# Patient Record
Sex: MALE | URBAN - METROPOLITAN AREA
[De-identification: names, ages, dates, MRNs, and addresses within clinical notes are randomized per-mention and may not be internally consistent; named-entity substitution may affect disease eponyms.]

---

## 2023-12-12 ENCOUNTER — TELEPHONE (OUTPATIENT)
Dept: GASTROENTEROLOGY | Facility: CLINIC | Age: 68
End: 2023-12-12

## 2023-12-12 NOTE — TELEPHONE ENCOUNTER
LMOM for patient to phone back and schedule a new patient appt with Dr. Greig Baumgarten for abnormal findings on imaging

## 2023-12-13 NOTE — TELEPHONE ENCOUNTER
Pt called to make appt.  With Dr. Wolfgang Marshall and I scheduled it for his next available 12/29/23

## 2023-12-28 RX ORDER — TRIAMCINOLONE ACETONIDE 5 MG/G
1 CREAM TOPICAL 3 TIMES DAILY
COMMUNITY
Start: 2023-09-01 | End: 2024-08-31

## 2023-12-28 RX ORDER — OXYCODONE HYDROCHLORIDE 30 MG/1
30 TABLET ORAL EVERY 4 HOURS PRN
COMMUNITY
Start: 2023-12-20

## 2023-12-28 RX ORDER — METOPROLOL SUCCINATE 50 MG/1
50 TABLET, EXTENDED RELEASE ORAL DAILY
COMMUNITY
Start: 2023-11-18

## 2023-12-28 RX ORDER — GABAPENTIN 300 MG/1
300 CAPSULE ORAL 3 TIMES DAILY
COMMUNITY
Start: 2023-12-20 | End: 2024-12-19

## 2023-12-28 RX ORDER — ATORVASTATIN CALCIUM 40 MG/1
40 TABLET, FILM COATED ORAL DAILY
COMMUNITY
Start: 2023-11-21

## 2023-12-28 RX ORDER — AMITRIPTYLINE HYDROCHLORIDE 50 MG/1
50 TABLET, FILM COATED ORAL
COMMUNITY
Start: 2023-12-20 | End: 2024-12-19

## 2023-12-28 RX ORDER — ASPIRIN 81 MG/1
81 TABLET ORAL DAILY
COMMUNITY

## 2023-12-28 RX ORDER — DULOXETIN HYDROCHLORIDE 60 MG/1
60 CAPSULE, DELAYED RELEASE ORAL 2 TIMES DAILY
COMMUNITY
Start: 2023-12-20 | End: 2024-12-19

## 2023-12-28 RX ORDER — ZOLPIDEM TARTRATE 10 MG/1
10 TABLET ORAL DAILY PRN
COMMUNITY
Start: 2023-12-20

## 2023-12-28 RX ORDER — LIDOCAINE 50 MG/G
PATCH TOPICAL
COMMUNITY
Start: 2023-09-25

## 2023-12-28 RX ORDER — METOPROLOL SUCCINATE 50 MG/1
50 TABLET, EXTENDED RELEASE ORAL
COMMUNITY
Start: 2023-08-06

## 2023-12-28 RX ORDER — BICTEGRAVIR SODIUM, EMTRICITABINE, AND TENOFOVIR ALAFENAMIDE FUMARATE 50; 200; 25 MG/1; MG/1; MG/1
1 TABLET ORAL DAILY
COMMUNITY
Start: 2023-08-08

## 2023-12-28 RX ORDER — FOLIC ACID 1 MG/1
1000 TABLET ORAL DAILY
COMMUNITY
Start: 2023-10-18

## 2023-12-28 RX ORDER — TRAZODONE HYDROCHLORIDE 150 MG/1
150 TABLET ORAL
COMMUNITY
Start: 2023-12-20 | End: 2024-01-19

## 2023-12-28 RX ORDER — ROPINIROLE 1 MG/1
1 TABLET, FILM COATED ORAL
COMMUNITY
Start: 2023-12-20 | End: 2024-12-19

## 2023-12-29 ENCOUNTER — CONSULT (OUTPATIENT)
Dept: GASTROENTEROLOGY | Facility: CLINIC | Age: 68
End: 2023-12-29
Payer: MEDICARE

## 2023-12-29 VITALS
BODY MASS INDEX: 26.07 KG/M2 | HEART RATE: 61 BPM | SYSTOLIC BLOOD PRESSURE: 176 MMHG | WEIGHT: 172 LBS | DIASTOLIC BLOOD PRESSURE: 77 MMHG | HEIGHT: 68 IN | OXYGEN SATURATION: 100 %

## 2023-12-29 DIAGNOSIS — R93.89 ABNORMAL ULTRASOUND: Primary | ICD-10-CM

## 2023-12-29 DIAGNOSIS — K76.0 FATTY LIVER: ICD-10-CM

## 2023-12-29 DIAGNOSIS — Z12.11 SCREENING FOR COLON CANCER: ICD-10-CM

## 2023-12-29 DIAGNOSIS — K59.01 SLOW TRANSIT CONSTIPATION: ICD-10-CM

## 2023-12-29 PROCEDURE — 99204 OFFICE O/P NEW MOD 45 MIN: CPT | Performed by: INTERNAL MEDICINE

## 2023-12-29 NOTE — PATIENT INSTRUCTIONS
Scheduled date of colonoscopy (as of today):1/31/24  Physician performing colonoscopy:Pyaam  Location of colonoscopy:[place]  Bowel prep reviewed with patient:Dulco/Miralax  Instructions reviewed with patient by: Heath marrero  Clearances:  none

## 2023-12-29 NOTE — PROGRESS NOTES
[facility] - Outpatient Consultation  Caesar Hurd 68 y.o. male MRN: [id number]  Encounter: [id number]          ASSESSMENT AND PLAN:      1. Abnormal ultrasound  -Fatty liver    2. Slow transit constipation  -High-fiber diet to include fruits, vegetables, whole-grain foods, daily  -Increase fiber supplement to twice daily  -Increase water intake to 6 to 8 glasses/day  - Colonoscopy; Future    3. Screening for colon cancer  - Colonoscopy; Future    4. Fatty liver  -Since the liver enzyme panel in April 2023 was essentially normal, I reassured the patient that it is unlikely that this fatty liver condition is aggressive.  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  I informed him that he is less likely to have the more aggressive form of fatty liver disease which we call metabolic dysfunction associated steatohepatitis (MASH)    Hepatic profile has been ordered    ______________________________________________________________________    HPI: Caesar comes to the office today for evaluation of an abnormal ultrasound.  The ultrasound which was performed on 12/1/2023 showed diffuse increase in hepatic echogenicity compatible with fatty infiltration.  There was no focal hepatic lesion.  This was compared to an ultrasound that was performed April 15, 2021 which showed a similar diffuse increase in hepatic echogenicity compatible with fatty infiltration.  His liver enzyme panel on August 16, 2021 showed an alkaline phosphatase of 128, total bili 0.5, AST 19, ALT 26.  He denies nausea vomiting but he does admit to right upper quadrant abdominal pain but this is provoked by exercise, not by eating.  He denies any heartburn, dysphagia, odynophagia.  He denies any rectal bleeding or melena.  He has a bowel movement every 4 days.  Sometimes she will have bowel movements more frequently.  Generally however has been suffering from constipation for  months to years.  He states that he does not drink a lot of water.  He also admits that he is not eating a lot of fiber.  He is taking Metamucil on a daily basis.  His BMI is elevated at 26.15 kg/m²      REVIEW OF SYSTEMS:    CONSTITUTIONAL: Denies any fever, chills, rigors, and weight loss.  HEENT: No earache or tinnitus. Denies hearing loss or visual disturbances.  CARDIOVASCULAR: No chest pain or palpitations.   RESPIRATORY: Denies any cough, hemoptysis, shortness of breath or dyspnea on exertion.  GASTROINTESTINAL: As noted in the History of Present Illness.   GENITOURINARY: No problems with urination. Denies any hematuria or dysuria.  NEUROLOGIC: No dizziness or vertigo, denies headaches.   MUSCULOSKELETAL: Denies any muscle or joint pain.   SKIN: Denies skin rashes or itching.   ENDOCRINE: Denies excessive thirst. Denies intolerance to heat or cold.  PSYCHOSOCIAL: Denies depression or anxiety. Denies any recent memory loss.       Historical Information   History reviewed. No pertinent past medical history.  History reviewed. No pertinent surgical history.  Social History   Social History     Substance and Sexual Activity   Alcohol Use Not Currently     Social History     Substance and Sexual Activity   Drug Use Never     Social History     Tobacco Use   Smoking Status Never   Smokeless Tobacco Never     Family History   Problem Relation Age of Onset    No Known Problems Mother     No Known Problems Father        Meds/Allergies       Current Outpatient Medications:     amitriptyline (ELAVIL) 50 mg tablet    aspirin (ECOTRIN LOW STRENGTH) 81 mg EC tablet    atorvastatin (LIPITOR) 40 mg tablet    bictegravir-emtricitab-tenofovir alafenamide (Biktarvy) -25 MG tablet    DARUNAVIR ETHANOLATE PO    DULoxetine (Cymbalta) 60 mg delayed release capsule    folic acid (FOLVITE) 1 mg tablet    gabapentin (NEURONTIN) 300 mg capsule    lidocaine (LIDODERM) 5 %    metoprolol succinate (TOPROL-XL) 50 mg 24 hr tablet     "metoprolol succinate (TOPROL-XL) 50 mg 24 hr tablet    oxyCODONE (ROXICODONE) 30 MG immediate release tablet    rOPINIRole (REQUIP) 1 mg tablet    traZODone (DESYREL) 150 mg tablet    triamcinolone (KENALOG) 0.5 % cream    zolpidem (AMBIEN) 10 mg tablet    No Known Allergies        Objective     Blood pressure (!) 176/77, pulse 61, height 5' 8\" (1.727 m), weight 78 kg (172 lb), SpO2 100%. Body mass index is 26.15 kg/m².        PHYSICAL EXAM:      General Appearance:   Alert, cooperative, no distress   HEENT:   Normocephalic, atraumatic, anicteric.     Neck:  Supple, symmetrical, trachea midline   Lungs:   Clear to auscultation bilaterally; no rales, rhonchi or wheezing; respirations unlabored    Heart::   Regular rate and rhythm; no murmur, rub, or gallop.   Abdomen:   Soft, non-tender, non-distended; normal bowel sounds; no masses, no organomegaly    Genitalia:   Deferred    Rectal:   Deferred    Extremities:  No cyanosis, clubbing or edema    Pulses:  2+ and symmetric    Skin:  No jaundice, rashes, or lesions    Lymph nodes:  No palpable cervical lymphadenopathy        Lab Results:   No visits with results within 1 Day(s) from this visit.   Latest known visit with results is:   No results found for any previous visit.         Radiology Results:   US LIVER    Result Date: 12/1/2023  Narrative: Clinical History: Liver pain. Comparison: Prior abdominal ultrasound, 4/15/2021. Comment: A right upper quadrant ultrasound was performed. The visualized portions of the pancreatic head and proximal body are unremarkable. The pancreatic tail is obscured by bowel gas. The liver is normal in size measuring 13.2 cm craniocaudally. Diffuse increase in hepatic echogenicity is compatible with fatty infiltration. There are no focal hepatic lesions appreciated given this limitation. Survey views of the right kidney show no hydronephrosis. The patient is status post cholecystectomy. There is no evidence of biliary ductal dilatation " with the extra hepatic bile duct measuring 5 mm in diameter.    Impression: IMPRESSION: Diffuse hepatic fatty infiltration. Poor visualization of portions of the pancreas due to bowel gas. Status post cholecystectomy. Other findings as above. Workstation:[id number]

## 2024-01-29 ENCOUNTER — TELEPHONE (OUTPATIENT)
Age: 69
End: 2024-01-29

## 2024-01-29 NOTE — TELEPHONE ENCOUNTER
Patient contacted office with procedure questions. Procedure instructions explained in detail. Patient expressed understanding and will contact our office with any further questions.

## 2024-01-31 ENCOUNTER — HOSPITAL ENCOUNTER (OUTPATIENT)
Dept: GASTROENTEROLOGY | Facility: HOSPITAL | Age: 69
Setting detail: OUTPATIENT SURGERY
Discharge: HOME | End: 2024-01-31
Attending: INTERNAL MEDICINE
Payer: MEDICARE

## 2024-01-31 ENCOUNTER — ANESTHESIA EVENT (OUTPATIENT)
Dept: GASTROENTEROLOGY | Facility: HOSPITAL | Age: 69
End: 2024-01-31

## 2024-01-31 ENCOUNTER — ANESTHESIA (OUTPATIENT)
Dept: GASTROENTEROLOGY | Facility: HOSPITAL | Age: 69
End: 2024-01-31

## 2024-01-31 VITALS
OXYGEN SATURATION: 99 % | HEIGHT: 68 IN | WEIGHT: 167.99 LBS | HEART RATE: 58 BPM | DIASTOLIC BLOOD PRESSURE: 77 MMHG | BODY MASS INDEX: 25.46 KG/M2 | RESPIRATION RATE: 17 BRPM | SYSTOLIC BLOOD PRESSURE: 162 MMHG | TEMPERATURE: 98 F

## 2024-01-31 DIAGNOSIS — Z12.11 SCREENING FOR COLON CANCER: ICD-10-CM

## 2024-01-31 DIAGNOSIS — K59.01 SLOW TRANSIT CONSTIPATION: ICD-10-CM

## 2024-01-31 PROBLEM — Z21 HIV INFECTION (HCC): Chronic | Status: ACTIVE | Noted: 2019-10-14

## 2024-01-31 PROBLEM — I10 ESSENTIAL HYPERTENSION: Status: ACTIVE | Noted: 2019-10-14

## 2024-01-31 PROBLEM — N18.31 STAGE 3A CHRONIC KIDNEY DISEASE (HCC): Status: ACTIVE | Noted: 2021-09-17

## 2024-01-31 PROBLEM — Z90.5 ACQUIRED ABSENCE OF KIDNEY: Status: ACTIVE | Noted: 2019-10-14

## 2024-01-31 PROBLEM — B20 HIV INFECTION (HCC): Chronic | Status: ACTIVE | Noted: 2019-10-14

## 2024-01-31 PROCEDURE — 88305 TISSUE EXAM BY PATHOLOGIST: CPT | Performed by: PATHOLOGY

## 2024-01-31 PROCEDURE — 45380 COLONOSCOPY AND BIOPSY: CPT | Performed by: INTERNAL MEDICINE

## 2024-01-31 RX ORDER — SODIUM CHLORIDE, SODIUM LACTATE, POTASSIUM CHLORIDE, CALCIUM CHLORIDE 600; 310; 30; 20 MG/100ML; MG/100ML; MG/100ML; MG/100ML
INJECTION, SOLUTION INTRAVENOUS CONTINUOUS PRN
Status: DISCONTINUED | OUTPATIENT
Start: 2024-01-31 | End: 2024-01-31

## 2024-01-31 RX ORDER — LIDOCAINE HYDROCHLORIDE 20 MG/ML
INJECTION, SOLUTION EPIDURAL; INFILTRATION; INTRACAUDAL; PERINEURAL AS NEEDED
Status: DISCONTINUED | OUTPATIENT
Start: 2024-01-31 | End: 2024-01-31

## 2024-01-31 RX ORDER — SODIUM CHLORIDE, SODIUM LACTATE, POTASSIUM CHLORIDE, CALCIUM CHLORIDE 600; 310; 30; 20 MG/100ML; MG/100ML; MG/100ML; MG/100ML
125 INJECTION, SOLUTION INTRAVENOUS CONTINUOUS
Status: CANCELLED | OUTPATIENT
Start: 2024-01-31

## 2024-01-31 RX ORDER — SODIUM CHLORIDE, SODIUM LACTATE, POTASSIUM CHLORIDE, CALCIUM CHLORIDE 600; 310; 30; 20 MG/100ML; MG/100ML; MG/100ML; MG/100ML
100 INJECTION, SOLUTION INTRAVENOUS CONTINUOUS
Status: CANCELLED | OUTPATIENT
Start: 2024-01-31

## 2024-01-31 RX ORDER — PROPOFOL 10 MG/ML
INJECTION, EMULSION INTRAVENOUS AS NEEDED
Status: DISCONTINUED | OUTPATIENT
Start: 2024-01-31 | End: 2024-01-31

## 2024-01-31 RX ADMIN — PROPOFOL 150 MG: 10 INJECTION, EMULSION INTRAVENOUS at 14:21

## 2024-01-31 RX ADMIN — PROPOFOL 50 MG: 10 INJECTION, EMULSION INTRAVENOUS at 14:34

## 2024-01-31 RX ADMIN — PROPOFOL 50 MG: 10 INJECTION, EMULSION INTRAVENOUS at 14:31

## 2024-01-31 RX ADMIN — LIDOCAINE HYDROCHLORIDE 80 MG: 20 INJECTION, SOLUTION EPIDURAL; INFILTRATION; INTRACAUDAL; PERINEURAL at 14:21

## 2024-01-31 RX ADMIN — PROPOFOL 50 MG: 10 INJECTION, EMULSION INTRAVENOUS at 14:25

## 2024-01-31 RX ADMIN — PROPOFOL 50 MG: 10 INJECTION, EMULSION INTRAVENOUS at 14:28

## 2024-01-31 RX ADMIN — SODIUM CHLORIDE, SODIUM LACTATE, POTASSIUM CHLORIDE, AND CALCIUM CHLORIDE: .6; .31; .03; .02 INJECTION, SOLUTION INTRAVENOUS at 13:01

## 2024-01-31 NOTE — ANESTHESIA PREPROCEDURE EVALUATION
Procedure:  COLONOSCOPY    Relevant Problems   CARDIO   (+) Essential hypertension      /RENAL   (+) Acquired absence of kidney   (+) Stage 3a chronic kidney disease (HCC)      Other   (+) HIV infection (HCC)        Physical Exam    Airway    Mallampati score: II  TM Distance: >3 FB  Neck ROM: full     Dental   Comment: Denies loose teeth     Cardiovascular  Cardiovascular exam normal    Pulmonary  Pulmonary exam normal     Other Findings  Portions of exam deferred due to low yield and/or unknown COVID status      Anesthesia Plan  ASA Score- 3     Anesthesia Type- IV sedation with anesthesia with ASA Monitors.         Additional Monitors:     Airway Plan:            Plan Factors-Exercise tolerance (METS): >4 METS.    Chart reviewed.   Existing labs reviewed. Patient summary reviewed.    Patient is not a current smoker.              Induction- intravenous.    Postoperative Plan-     Informed Consent- Anesthetic plan and risks discussed with patient.  I personally reviewed this patient with the CRNA. Discussed and agreed on the Anesthesia Plan with the CRNA..

## 2024-01-31 NOTE — H&P
"History and Physical -  [facility]  Caesar Hurd 68 y.o. male MRN: [id number]      HPI: Caesar Hurd is a 68 y.o. year old male who presents for evaluation of constipation and for screening colonoscopy      REVIEW OF SYSTEMS: Per the HPI, and otherwise unremarkable.    Historical Information   Past Medical History:   Diagnosis Date    Prostate cancer (HCC)      Past Surgical History:   Procedure Laterality Date    KIDNEY SURGERY Left     removal     Social History   Social History     Substance and Sexual Activity   Alcohol Use Not Currently     Social History     Substance and Sexual Activity   Drug Use Never     Social History     Tobacco Use   Smoking Status Never   Smokeless Tobacco Never     Family History   Problem Relation Age of Onset    No Known Problems Mother     No Known Problems Father        Meds/Allergies     (Not in a hospital admission)      No Known Allergies    Objective     Blood pressure 168/79, pulse 55, temperature 97.7 °F (36.5 °C), temperature source Temporal, resp. rate 18, height 5' 8\" (1.727 m), weight 76.2 kg (167 lb 15.9 oz), SpO2 99%.      PHYSICAL EXAM    Gen: NAD  CV: RRR  CHEST: Clear  ABD: soft, NT/ND  EXT: no edema      ASSESSMENT/PLAN:  This is a 68 y.o. year old male here for colonoscopy, and he is stable and optimized for his procedure.          "

## 2024-01-31 NOTE — ANESTHESIA POSTPROCEDURE EVALUATION
Post-Op Assessment Note    CV Status:  Stable  Pain Score: 0    Pain management: adequate       Mental Status:  Sleepy   Hydration Status:  Stable   PONV Controlled:  None   Airway Patency:  Patent     Post Op Vitals Reviewed: Yes    No anethesia notable event occurred.    Staff: Anesthesiologist, CRNA               /61 (01/31/24 1440)    Temp 98 °F (36.7 °C) (01/31/24 1440)    Pulse 55 (01/31/24 1440)   Resp 20 (01/31/24 1440)    SpO2 97 % (01/31/24 1440)

## 2024-02-05 PROCEDURE — 88305 TISSUE EXAM BY PATHOLOGIST: CPT | Performed by: PATHOLOGY

## 2024-02-07 ENCOUNTER — TELEPHONE (OUTPATIENT)
Dept: GASTROENTEROLOGY | Facility: CLINIC | Age: 69
End: 2024-02-07

## 2024-02-07 NOTE — TELEPHONE ENCOUNTER
----- Message from Tarun Hare DO sent at 2/7/2024  7:53 AM EST -----  Please call the patient with the polyp result.  The polyp of the rectum was a benign hyperplastic polyp.  The patient's next colonoscopy will be due in 10 years.   none

## 2024-02-07 NOTE — TELEPHONE ENCOUNTER
Attempted to call pt but no answer.  VM left with the polyp result, to do a colonoscopy in 10 years, and to call us back if any questions.  Office # provided.      Please call the patient with the polyp result.  The polyp of the rectum was a benign hyperplastic polyp.  The patient's next colonoscopy will be due in 10 years.

## 2025-04-26 ENCOUNTER — APPOINTMENT (EMERGENCY)
Dept: CT IMAGING | Facility: HOSPITAL | Age: 70
DRG: 392 | End: 2025-04-26
Payer: COMMERCIAL

## 2025-04-26 ENCOUNTER — HOSPITAL ENCOUNTER (INPATIENT)
Facility: HOSPITAL | Age: 70
LOS: 4 days | Discharge: HOME | DRG: 392 | End: 2025-04-30
Attending: EMERGENCY MEDICINE | Admitting: FAMILY MEDICINE
Payer: COMMERCIAL

## 2025-04-26 DIAGNOSIS — C80.0: ICD-10-CM

## 2025-04-26 DIAGNOSIS — K76.9 HEPATIC LESION: ICD-10-CM

## 2025-04-26 DIAGNOSIS — I10 ESSENTIAL HYPERTENSION: ICD-10-CM

## 2025-04-26 DIAGNOSIS — Z21 HIV INFECTION, UNSPECIFIED SYMPTOM STATUS (HCC): Chronic | ICD-10-CM

## 2025-04-26 DIAGNOSIS — M89.9 BONE LESION: ICD-10-CM

## 2025-04-26 DIAGNOSIS — K59.00 CONSTIPATION: Primary | ICD-10-CM

## 2025-04-26 DIAGNOSIS — R10.9 ABDOMINAL PAIN: ICD-10-CM

## 2025-04-26 DIAGNOSIS — K62.89 RECTAL MASS: ICD-10-CM

## 2025-04-26 DIAGNOSIS — K76.9 LESION OF LIVER: ICD-10-CM

## 2025-04-26 LAB
ALBUMIN SERPL BCG-MCNC: 4.2 G/DL (ref 3.5–5)
ALP SERPL-CCNC: 670 U/L (ref 34–104)
ALT SERPL W P-5'-P-CCNC: 17 U/L (ref 7–52)
ANION GAP SERPL CALCULATED.3IONS-SCNC: 4 MMOL/L (ref 4–13)
AST SERPL W P-5'-P-CCNC: 34 U/L (ref 13–39)
BASOPHILS # BLD AUTO: 0.03 THOUSANDS/ÂΜL (ref 0–0.1)
BASOPHILS NFR BLD AUTO: 0 % (ref 0–1)
BILIRUB SERPL-MCNC: 0.98 MG/DL (ref 0.2–1)
BILIRUB UR QL STRIP: NEGATIVE
BUN SERPL-MCNC: 17 MG/DL (ref 5–25)
CALCIUM SERPL-MCNC: 9.6 MG/DL (ref 8.4–10.2)
CHLORIDE SERPL-SCNC: 105 MMOL/L (ref 96–108)
CLARITY UR: CLEAR
CO2 SERPL-SCNC: 30 MMOL/L (ref 21–32)
COLOR UR: COLORLESS
CREAT SERPL-MCNC: 1 MG/DL (ref 0.6–1.3)
EOSINOPHIL # BLD AUTO: 0.06 THOUSAND/ÂΜL (ref 0–0.61)
EOSINOPHIL NFR BLD AUTO: 1 % (ref 0–6)
ERYTHROCYTE [DISTWIDTH] IN BLOOD BY AUTOMATED COUNT: 12.3 % (ref 11.6–15.1)
GFR SERPL CREATININE-BSD FRML MDRD: 76 ML/MIN/1.73SQ M
GLUCOSE SERPL-MCNC: 115 MG/DL (ref 65–140)
GLUCOSE UR STRIP-MCNC: NEGATIVE MG/DL
HCT VFR BLD AUTO: 34.8 % (ref 36.5–49.3)
HGB BLD-MCNC: 11.4 G/DL (ref 12–17)
HGB UR QL STRIP.AUTO: NEGATIVE
IMM GRANULOCYTES # BLD AUTO: 0.02 THOUSAND/UL (ref 0–0.2)
IMM GRANULOCYTES NFR BLD AUTO: 0 % (ref 0–2)
KETONES UR STRIP-MCNC: NEGATIVE MG/DL
LEUKOCYTE ESTERASE UR QL STRIP: NEGATIVE
LIPASE SERPL-CCNC: 16 U/L (ref 11–82)
LYMPHOCYTES # BLD AUTO: 2.41 THOUSANDS/ÂΜL (ref 0.6–4.47)
LYMPHOCYTES NFR BLD AUTO: 34 % (ref 14–44)
MCH RBC QN AUTO: 30.1 PG (ref 26.8–34.3)
MCHC RBC AUTO-ENTMCNC: 32.8 G/DL (ref 31.4–37.4)
MCV RBC AUTO: 92 FL (ref 82–98)
MONOCYTES # BLD AUTO: 0.8 THOUSAND/ÂΜL (ref 0.17–1.22)
MONOCYTES NFR BLD AUTO: 11 % (ref 4–12)
NEUTROPHILS # BLD AUTO: 3.68 THOUSANDS/ÂΜL (ref 1.85–7.62)
NEUTS SEG NFR BLD AUTO: 54 % (ref 43–75)
NITRITE UR QL STRIP: NEGATIVE
NRBC BLD AUTO-RTO: 0 /100 WBCS
PH UR STRIP.AUTO: 6 [PH]
PLATELET # BLD AUTO: 211 THOUSANDS/UL (ref 149–390)
PMV BLD AUTO: 8.4 FL (ref 8.9–12.7)
POTASSIUM SERPL-SCNC: 4.6 MMOL/L (ref 3.5–5.3)
PROT SERPL-MCNC: 7.1 G/DL (ref 6.4–8.4)
PROT UR STRIP-MCNC: NEGATIVE MG/DL
RBC # BLD AUTO: 3.79 MILLION/UL (ref 3.88–5.62)
SODIUM SERPL-SCNC: 139 MMOL/L (ref 135–147)
SP GR UR STRIP.AUTO: 1.02 (ref 1–1.03)
UROBILINOGEN UR STRIP-ACNC: <2 MG/DL
WBC # BLD AUTO: 7 THOUSAND/UL (ref 4.31–10.16)

## 2025-04-26 PROCEDURE — 99284 EMERGENCY DEPT VISIT MOD MDM: CPT

## 2025-04-26 PROCEDURE — 99223 1ST HOSP IP/OBS HIGH 75: CPT | Performed by: FAMILY MEDICINE

## 2025-04-26 PROCEDURE — 36415 COLL VENOUS BLD VENIPUNCTURE: CPT | Performed by: PHYSICIAN ASSISTANT

## 2025-04-26 PROCEDURE — 80053 COMPREHEN METABOLIC PANEL: CPT | Performed by: PHYSICIAN ASSISTANT

## 2025-04-26 PROCEDURE — 74177 CT ABD & PELVIS W/CONTRAST: CPT

## 2025-04-26 PROCEDURE — 99285 EMERGENCY DEPT VISIT HI MDM: CPT | Performed by: PHYSICIAN ASSISTANT

## 2025-04-26 PROCEDURE — 96360 HYDRATION IV INFUSION INIT: CPT

## 2025-04-26 PROCEDURE — 81003 URINALYSIS AUTO W/O SCOPE: CPT | Performed by: PHYSICIAN ASSISTANT

## 2025-04-26 PROCEDURE — 83690 ASSAY OF LIPASE: CPT | Performed by: PHYSICIAN ASSISTANT

## 2025-04-26 PROCEDURE — 85025 COMPLETE CBC W/AUTO DIFF WBC: CPT | Performed by: PHYSICIAN ASSISTANT

## 2025-04-26 RX ORDER — ZOLPIDEM TARTRATE 5 MG/1
10 TABLET ORAL DAILY PRN
Status: DISCONTINUED | OUTPATIENT
Start: 2025-04-26 | End: 2025-04-30 | Stop reason: HOSPADM

## 2025-04-26 RX ORDER — METOPROLOL SUCCINATE 50 MG/1
50 TABLET, EXTENDED RELEASE ORAL DAILY
Status: DISCONTINUED | OUTPATIENT
Start: 2025-04-27 | End: 2025-04-30 | Stop reason: HOSPADM

## 2025-04-26 RX ORDER — ACETAMINOPHEN 325 MG/1
650 TABLET ORAL EVERY 6 HOURS PRN
Status: DISCONTINUED | OUTPATIENT
Start: 2025-04-26 | End: 2025-04-30 | Stop reason: HOSPADM

## 2025-04-26 RX ORDER — OXYCODONE HYDROCHLORIDE 10 MG/1
20 TABLET ORAL EVERY 4 HOURS PRN
Refills: 0 | Status: DISCONTINUED | OUTPATIENT
Start: 2025-04-26 | End: 2025-04-30 | Stop reason: HOSPADM

## 2025-04-26 RX ORDER — HEPARIN SODIUM 5000 [USP'U]/ML
5000 INJECTION, SOLUTION INTRAVENOUS; SUBCUTANEOUS EVERY 8 HOURS SCHEDULED
Status: DISCONTINUED | OUTPATIENT
Start: 2025-04-26 | End: 2025-04-30 | Stop reason: HOSPADM

## 2025-04-26 RX ORDER — SODIUM CHLORIDE, SODIUM LACTATE, POTASSIUM CHLORIDE, CALCIUM CHLORIDE 600; 310; 30; 20 MG/100ML; MG/100ML; MG/100ML; MG/100ML
75 INJECTION, SOLUTION INTRAVENOUS CONTINUOUS
Status: DISCONTINUED | OUTPATIENT
Start: 2025-04-26 | End: 2025-04-29

## 2025-04-26 RX ORDER — ABIRATERONE 500 MG/1
1000 TABLET ORAL DAILY
COMMUNITY
Start: 2025-04-18

## 2025-04-26 RX ORDER — FOLIC ACID 1 MG/1
1000 TABLET ORAL DAILY
Status: DISCONTINUED | OUTPATIENT
Start: 2025-04-27 | End: 2025-04-30 | Stop reason: HOSPADM

## 2025-04-26 RX ORDER — HYDRALAZINE HYDROCHLORIDE 20 MG/ML
5 INJECTION INTRAMUSCULAR; INTRAVENOUS ONCE
Status: COMPLETED | OUTPATIENT
Start: 2025-04-26 | End: 2025-04-26

## 2025-04-26 RX ORDER — OXYCODONE HYDROCHLORIDE 10 MG/1
30 TABLET ORAL EVERY 4 HOURS PRN
Status: DISCONTINUED | OUTPATIENT
Start: 2025-04-26 | End: 2025-04-26

## 2025-04-26 RX ORDER — MAGNESIUM CARB/ALUMINUM HYDROX 105-160MG
296 TABLET,CHEWABLE ORAL ONCE
Status: DISCONTINUED | OUTPATIENT
Start: 2025-04-26 | End: 2025-04-30 | Stop reason: HOSPADM

## 2025-04-26 RX ORDER — POLYETHYLENE GLYCOL 3350 17 G/17G
17 POWDER, FOR SOLUTION ORAL 2 TIMES DAILY
Status: DISCONTINUED | OUTPATIENT
Start: 2025-04-27 | End: 2025-04-27

## 2025-04-26 RX ORDER — MAGNESIUM HYDROXIDE/ALUMINUM HYDROXICE/SIMETHICONE 120; 1200; 1200 MG/30ML; MG/30ML; MG/30ML
30 SUSPENSION ORAL EVERY 6 HOURS PRN
Status: DISCONTINUED | OUTPATIENT
Start: 2025-04-26 | End: 2025-04-30 | Stop reason: HOSPADM

## 2025-04-26 RX ORDER — AMOXICILLIN 250 MG
2 CAPSULE ORAL 2 TIMES DAILY
Status: DISCONTINUED | OUTPATIENT
Start: 2025-04-26 | End: 2025-04-30 | Stop reason: HOSPADM

## 2025-04-26 RX ORDER — ATORVASTATIN CALCIUM 40 MG/1
40 TABLET, FILM COATED ORAL
Status: DISCONTINUED | OUTPATIENT
Start: 2025-04-26 | End: 2025-04-30 | Stop reason: HOSPADM

## 2025-04-26 RX ORDER — HYDRALAZINE HYDROCHLORIDE 25 MG/1
25 TABLET, FILM COATED ORAL ONCE
Status: DISCONTINUED | OUTPATIENT
Start: 2025-04-26 | End: 2025-04-26

## 2025-04-26 RX ORDER — GABAPENTIN 300 MG/1
300 CAPSULE ORAL
Status: DISCONTINUED | OUTPATIENT
Start: 2025-04-26 | End: 2025-04-30 | Stop reason: HOSPADM

## 2025-04-26 RX ORDER — POLYETHYLENE GLYCOL 3350 17 G/17G
17 POWDER, FOR SOLUTION ORAL DAILY PRN
Status: DISCONTINUED | OUTPATIENT
Start: 2025-04-26 | End: 2025-04-26

## 2025-04-26 RX ORDER — ABIRATERONE 500 MG/1
1000 TABLET ORAL DAILY
Status: DISCONTINUED | OUTPATIENT
Start: 2025-04-27 | End: 2025-04-30 | Stop reason: HOSPADM

## 2025-04-26 RX ORDER — ONDANSETRON 2 MG/ML
4 INJECTION INTRAMUSCULAR; INTRAVENOUS EVERY 6 HOURS PRN
Status: DISCONTINUED | OUTPATIENT
Start: 2025-04-26 | End: 2025-04-30 | Stop reason: HOSPADM

## 2025-04-26 RX ADMIN — SODIUM CHLORIDE, SODIUM LACTATE, POTASSIUM CHLORIDE, AND CALCIUM CHLORIDE 75 ML/HR: .6; .31; .03; .02 INJECTION, SOLUTION INTRAVENOUS at 18:55

## 2025-04-26 RX ADMIN — HYDRALAZINE HYDROCHLORIDE 5 MG: 20 INJECTION INTRAMUSCULAR; INTRAVENOUS at 22:27

## 2025-04-26 RX ADMIN — OXYCODONE HYDROCHLORIDE 20 MG: 10 TABLET ORAL at 21:20

## 2025-04-26 RX ADMIN — SODIUM CHLORIDE 1000 ML: 0.9 INJECTION, SOLUTION INTRAVENOUS at 12:34

## 2025-04-26 RX ADMIN — GABAPENTIN 300 MG: 300 CAPSULE ORAL at 21:22

## 2025-04-26 RX ADMIN — HEPARIN SODIUM 5000 UNITS: 5000 INJECTION INTRAVENOUS; SUBCUTANEOUS at 21:22

## 2025-04-26 RX ADMIN — IOHEXOL 100 ML: 350 INJECTION, SOLUTION INTRAVENOUS at 13:05

## 2025-04-26 RX ADMIN — ATORVASTATIN CALCIUM 40 MG: 40 TABLET, FILM COATED ORAL at 18:55

## 2025-04-26 RX ADMIN — SENNOSIDES AND DOCUSATE SODIUM 2 TABLET: 50; 8.6 TABLET ORAL at 18:51

## 2025-04-26 RX ADMIN — HEPARIN SODIUM 5000 UNITS: 5000 INJECTION INTRAVENOUS; SUBCUTANEOUS at 18:55

## 2025-04-26 NOTE — PLAN OF CARE
Problem: PAIN - ADULT  Goal: Verbalizes/displays adequate comfort level or baseline comfort level  Description: Interventions:- Encourage patient to monitor pain and request assistance- Assess pain using appropriate pain scale- Administer analgesics based on type and severity of pain and evaluate response- Implement non-pharmacological measures as appropriate and evaluate response- Consider cultural and social influences on pain and pain management- Notify physician/advanced practitioner if interventions unsuccessful or patient reports new pain  Outcome: Progressing     Problem: INFECTION - ADULT  Goal: Absence or prevention of progression during hospitalization  Description: INTERVENTIONS:- Assess and monitor for signs and symptoms of infection- Monitor lab/diagnostic results- Monitor all insertion sites, i.e. indwelling lines, tubes, and drains- Monitor endotracheal if appropriate and nasal secretions for changes in amount and color- [place] appropriate cooling/warming therapies per order- Administer medications as ordered- Instruct and encourage patient and family to use good hand hygiene technique- Identify and instruct in appropriate isolation precautions for identified infection/condition  Outcome: Progressing

## 2025-04-26 NOTE — ASSESSMENT & PLAN NOTE
History of lymphoma, renal cell carcinoma, prostate adenocarcinoma; now with rectal mass concerning for cancer  Followed by [facility] oncology teams

## 2025-04-26 NOTE — ASSESSMENT & PLAN NOTE
Presents with 10 days of constipation, has been battling for months and was found to have a perirectal mass in January of this year.   Chronic constipation at this point, complicated by rectal mass  GI consulted  Aggressive bowel regimen  s/p soap suds enema, will give mineral oil enema and start oral regimen  Will monitor stool output

## 2025-04-26 NOTE — H&P
"H&P - Hospitalist   Name: Caesar Hurd 70 y.o. male I MRN: [id number]  Unit/Bed#: ED 24 I Date of Admission: 4/26/2025   Date of Service: 4/26/2025 I Hospital Day: 0     Assessment & Plan  Constipation  Presents with 10 days of constipation, has been battling for months and was found to have a perirectal mass in January of this year.   Chronic constipation at this point, complicated by rectal mass  GI consulted  Aggressive bowel regimen  s/p soap suds enema, will give mineral oil enema and start oral regimen  Will monitor stool output   Rectal mass  Identified earlier this year due to patient reporting sensation of something \"blocking him\" causing constipation. He underwent colonoscopy with biopsy 3/21/2025 through [facility] colorectal service. Biopsy was NOT consistent with colorectal cancer at that time.    CT abdomen/pelvis (4/26/25): There is a large rectal mass, with direct invasion of the mesorectal fascia, left seminal vesicle, left bladder base, left pelvic sidewall. There are multiple left iliac and multiple hepatic lesions, highly suspicious for metastatic disease. The prior outside comparison studies are not available for direct comparison at this time. There are also multiple sclerotic osseous lesions, consistent with metastatic disease. This may be separately related to the patient's prostate cancer.  GI consulted,  Plan for flex sig on Monday with additional biopsies  Additional recs pending workup  Essential hypertension  Chronic, on metoprolol which we will continue  Monitor VS per unit protocol   HIV infection (HCC)  History of, on Biktarvy and endorses compliance  Will continue HAART  Stage 3a chronic kidney disease (HCC)  Cr baseline 1.2-1.3, presenting Cr 1.00  Will monitor   Cancer of multiple primary sites (HCC)  History of lymphoma, renal cell carcinoma, prostate adenocarcinoma; now with rectal mass concerning for cancer  Followed by [facility] oncology teams      VTE Pharmacologic Prophylaxis: VTE " "Score: 5 High Risk (Score >/= 5) - Pharmacological DVT Prophylaxis Ordered: enoxaparin (Lovenox). Sequential Compression Devices Ordered.  Code Status: No Order FULL CODE   Discussion with family: Patient declined call to .     Anticipated Length of Stay: Patient will be admitted on an inpatient basis with an anticipated length of stay of greater than 2 midnights secondary to obstipation, rectal mass .    History of Present Illness   Chief Complaint: Constipation (Pt c/o constipation with last BM being 10 days ago. Pt reports \"trying everything\" with no relief. Hx prostate cancer. )     Caesar Hurd is a 70-year-old male with a PMHx of HIV on antiretroviral therapy, renal cell carcinoma (s/p left nephrectomy 2000), lymphoma (s/p chemotherapy 2008), prostate cancer IIIc (s/p adrogen deprivation therapy and radiation therapy 2019), HTN, and newly diagnosed rectal mass as per colonoscopy 3/21/2025 with [facility] colorectal surgery.  He is presenting to the ED for evaluation of constipation x10 days.  Workup in ER revealed large rectal mass with direct invasion into the mesorectal fascia, left seminal vesicle and bladder base as well as left pelvic sidewall with evidence of metastatic disease.  There is a large amount of stool but no evidence proximal dilatation to suggest complete obstruction.     The case was discussed with GI who recommend admission for management of constipation with plan for flex sig on Monday to reassess and obtain more biopsies, as patient is requesting second opinion for diagnosis/treatment.     Review of Systems   Constitutional:  Negative for activity change, appetite change, fever and unexpected weight change.   Respiratory:  Negative for shortness of breath.    Cardiovascular:  Negative for chest pain and palpitations.   Gastrointestinal:  Positive for abdominal distention, abdominal pain and constipation.   Genitourinary:  Negative for decreased urine volume and difficulty " urinating.   Allergic/Immunologic: Positive for immunocompromised state.   All other systems reviewed and are negative.      Historical Information   Past Medical History:   Diagnosis Date    Prostate cancer (HCC)      Past Surgical History:   Procedure Laterality Date    KIDNEY SURGERY Left     removal     Social History     Tobacco Use    Smoking status: Never    Smokeless tobacco: Never   Vaping Use    Vaping status: Never Used   Substance and Sexual Activity    Alcohol use: Not Currently    Drug use: Never    Sexual activity: Not on file     E-Cigarette/Vaping    E-Cigarette Use Never User      E-Cigarette/Vaping Substances    Nicotine No     THC No     CBD No     Flavoring No     Other No     Unknown No      Family History   Problem Relation Age of Onset    No Known Problems Mother     No Known Problems Father      Social History:  Marital Status: Single   Occupation:    Patient Pre-hospital Living Situation: Home  Patient Pre-hospital Level of Mobility: walks  Patient Pre-hospital Diet Restrictions:     Meds/Allergies   I have reviewed home medications with patient personally.  Prior to Admission medications    Medication Sig Start Date End Date Taking? Authorizing Provider   amitriptyline (ELAVIL) 50 mg tablet Take 50 mg by mouth 12/20/23 12/19/24  Historical Provider, MD   aspirin (ECOTRIN LOW STRENGTH) 81 mg EC tablet Take 81 mg by mouth daily    Historical Provider, MD   atorvastatin (LIPITOR) 40 mg tablet Take 40 mg by mouth daily 11/21/23   Historical Provider, MD   bictegravir-emtricitab-tenofovir alafenamide (Biktarvy) -25 MG tablet Take 1 tablet by mouth daily 8/8/23   Historical Provider, MD   DARUNAVIR ETHANOLATE  mg daily    Historical Provider, MD   DULoxetine (Cymbalta) 60 mg delayed release capsule Take 60 mg by mouth 2 (two) times a day 12/20/23 12/19/24  Historical Provider, MD   folic acid (FOLVITE) 1 mg tablet Take 1,000 mcg by mouth daily 10/18/23   Historical Provider, MD    gabapentin (NEURONTIN) 300 mg capsule Take 300 mg by mouth Three times a day 12/20/23 12/19/24  Historical Provider, MD   lidocaine (LIDODERM) 5 % PLACE 1 PATCH ON THE SKIN DAILY. REMOVE & DISCARD PATCH WITHIN 12 HOURS OR AS DIRECTED BY MD 9/25/23   Historical Provider, MD   metoprolol succinate (TOPROL-XL) 50 mg 24 hr tablet Take 50 mg by mouth daily 11/18/23   Historical Provider, MD   metoprolol succinate (TOPROL-XL) 50 mg 24 hr tablet Take 50 mg by mouth 8/6/23   Historical Provider, MD   oxyCODONE (ROXICODONE) 30 MG immediate release tablet Take 30 mg by mouth every 4 (four) hours as needed 12/20/23   Historical Provider, MD   rOPINIRole (REQUIP) 1 mg tablet Take 1 mg by mouth 12/20/23 12/19/24  Historical Provider, MD   traZODone (DESYREL) 150 mg tablet Take 150 mg by mouth 12/20/23 1/19/24  Historical Provider, MD   triamcinolone (KENALOG) 0.5 % cream Apply 1 application. topically 3 (three) times a day 9/1/23 8/31/24  Historical Provider, MD   zolpidem (AMBIEN) 10 mg tablet Take 10 mg by mouth daily as needed 12/20/23   Historical Provider, MD     No Known Allergies    Objective :  Temp:  [97.6 °F (36.4 °C)] 97.6 °F (36.4 °C)  HR:  [52-68] 52  BP: (180-203)/(73-88) 188/83  Resp:  [20] 20  SpO2:  [96 %-99 %] 98 %  O2 Device: None (Room air)    Physical Exam  Vitals and nursing note reviewed.   Constitutional:       General: He is awake. He is not in acute distress.     Appearance: Normal appearance. He is well-developed and well-groomed. He is not ill-appearing or toxic-appearing.   HENT:      Head: Normocephalic and atraumatic.      Mouth/Throat:      Mouth: Mucous membranes are moist.   Eyes:      General: No scleral icterus.  Cardiovascular:      Rate and Rhythm: Normal rate and regular rhythm.      Heart sounds: Murmur heard.   Pulmonary:      Effort: Pulmonary effort is normal. No respiratory distress.      Breath sounds: Normal breath sounds. No wheezing.   Abdominal:      General: Bowel sounds are  decreased. There is distension.      Palpations: Abdomen is soft.      Tenderness: There is no abdominal tenderness. There is no guarding.   Musculoskeletal:      Right lower leg: No edema.      Left lower leg: No edema.   Skin:     General: Skin is warm and dry.      Coloration: Skin is not jaundiced or pale.   Neurological:      General: No focal deficit present.      Mental Status: He is alert and oriented to person, place, and time. Mental status is at baseline.      Cranial Nerves: No cranial nerve deficit.   Psychiatric:         Attention and Perception: Attention and perception normal.         Mood and Affect: Mood and affect normal.         Speech: Speech normal.         Behavior: Behavior normal. Behavior is cooperative.         Thought Content: Thought content normal.         Cognition and Memory: Cognition normal.         Judgment: Judgment normal.           Lines/Drains:            Lab Results: I have reviewed the following results:  Results from last 7 days   Lab Units 04/26/25  1234   WBC Thousand/uL 7.00   HEMOGLOBIN g/dL 11.4*   HEMATOCRIT % 34.8*   PLATELETS Thousands/uL 211   SEGS PCT % 54   LYMPHO PCT % 34   MONO PCT % 11   EOS PCT % 1     Results from last 7 days   Lab Units 04/26/25  1234   SODIUM mmol/L 139   POTASSIUM mmol/L 4.6   CHLORIDE mmol/L 105   CO2 mmol/L 30   BUN mg/dL 17   CREATININE mg/dL 1.00   ANION GAP mmol/L 4   CALCIUM mg/dL 9.6   ALBUMIN g/dL 4.2   TOTAL BILIRUBIN mg/dL 0.98   ALK PHOS U/L 670*   ALT U/L 17   AST U/L 34   GLUCOSE RANDOM mg/dL 115             Lab Results   Component Value Date    HGBA1C 6.1 (H) 04/12/2021    HGBA1C 5.5 08/21/2018    HGBA1C 5.2 05/28/2018           Imaging Results Review: I reviewed radiology reports from this admission including: CT abdomen/pelvis.  Other Study Results Review: EKG was personally reviewed and my interpretation is: No EKG obtained..    Administrative Statements   I have spent a total time of 60 minutes in caring for this patient  on the day of the visit/encounter including Risks and benefits of tx options, Instructions for management, Patient and family education, Counseling / Coordination of care, Documenting in the medical record, Reviewing/placing orders in the medical record (including tests, medications, and/or procedures), Obtaining or reviewing history  , and Communicating with other healthcare professionals .    ** Please Note: This note has been constructed using a voice recognition system. **

## 2025-04-26 NOTE — PLAN OF CARE
Problem: PAIN - ADULT  Goal: Verbalizes/displays adequate comfort level or baseline comfort level  Description: Interventions:- Encourage patient to monitor pain and request assistance- Assess pain using appropriate pain scale- Administer analgesics based on type and severity of pain and evaluate response- Implement non-pharmacological measures as appropriate and evaluate response- Consider cultural and social influences on pain and pain management- Notify physician/advanced practitioner if interventions unsuccessful or patient reports new pain  Outcome: Progressing     Problem: INFECTION - ADULT  Goal: Absence or prevention of progression during hospitalization  Description: INTERVENTIONS:- Assess and monitor for signs and symptoms of infection- Monitor lab/diagnostic results- Monitor all insertion sites, i.e. indwelling lines, tubes, and drains- Monitor endotracheal if appropriate and nasal secretions for changes in amount and color- [place] appropriate cooling/warming therapies per order- Administer medications as ordered- Instruct and encourage patient and family to use good hand hygiene technique- Identify and instruct in appropriate isolation precautions for identified infection/condition  Outcome: Progressing  Goal: Absence of fever/infection during neutropenic period  Description: INTERVENTIONS:- Monitor WBC  Outcome: Progressing     Problem: SAFETY ADULT  Goal: Patient will remain free of falls  Description: INTERVENTIONS:- Educate patient/family on patient safety including physical limitations- Instruct patient to call for assistance with activity - Consult OT/PT to assist with strengthening/mobility - Keep Call bell within reach- Keep bed low and locked with side rails adjusted as appropriate- Keep care items and personal belongings within reach- Initiate and maintain comfort rounds- Make Fall Risk Sign visible to staff- Offer Toileting every  Hours, in advance of need- Initiate/Maintain alarm- Obtain  necessary fall risk management equipment: - Apply yellow socks and bracelet for high fall risk patients- Consider moving patient to room near nurses station  Outcome: Progressing  Goal: Maintain or return to baseline ADL function  Description: INTERVENTIONS:-  Assess patient's ability to carry out ADLs; assess patient's baseline for ADL function and identify physical deficits which impact ability to perform ADLs (bathing, care of mouth/teeth, toileting, grooming, dressing, etc.)- Assess/evaluate cause of self-care deficits - Assess range of motion- Assess patient's mobility; develop plan if impaired- Assess patient's need for assistive devices and provide as appropriate- Encourage maximum independence but intervene and supervise when necessary- Involve family in performance of ADLs- Assess for home care needs following discharge - Consider OT consult to assist with ADL evaluation and planning for discharge- Provide patient education as appropriate  Outcome: Progressing  Goal: Maintains/Returns to pre admission functional level  Description: INTERVENTIONS:- Perform AM-PAC 6 Click Basic Mobility/ Daily Activity assessment daily.- Set and communicate daily mobility goal to care team and patient/family/caregiver. - Collaborate with rehabilitation services on mobility goals if consulted- Perform Range of Motion  times a day.- Reposition patient every  hours.- Dangle patient  times a day- Stand patient  times a day- Ambulate patient  times a day- Out of bed to chair  times a day - Out of bed for meals  times a day- Out of bed for toileting- Record patient progress and toleration of activity level   Outcome: Progressing     Problem: DISCHARGE PLANNING  Goal: Discharge to home or other facility with appropriate resources  Description: INTERVENTIONS:- Identify barriers to discharge w/patient and caregiver- Arrange for needed discharge resources and transportation as appropriate- Identify discharge learning needs (meds, wound  care, etc.)- Arrange for interpretive services to assist at discharge as needed- Refer to Case Management Department for coordinating discharge planning if the patient needs post-hospital services based on physician/advanced practitioner order or complex needs related to functional status, cognitive ability, or social support system  Outcome: Progressing     Problem: Knowledge Deficit  Goal: Patient/family/caregiver demonstrates understanding of disease process, treatment plan, medications, and discharge instructions  Description: Complete learning assessment and assess knowledge base.Interventions:- Provide teaching at level of understanding- Provide teaching via preferred learning methods  Outcome: Progressing

## 2025-04-26 NOTE — ED PROVIDER NOTES
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 Yee.               Assessment & Plan       Medical Decision Making  Patient is a 70-year-old male with a PMHx of HIV, HTN, kidney cancer (s/p left nephrectomy), prostate cancer and newly diagnosed rectal mass, presenting to the ED for evaluation of constipation x10 days.     DDx including but not limited to: constipation, fecal impaction, bowel obstruction, ileus, volvulus, internal hernia, tumor.     Patient's labs are notable for an elevated alk phos (similar to prior) but are otherwise unremarkable.  CT abdomen/pelvis shows \"a large rectal mass, with direct invasion of the mesorectal fascia, left seminal vesicle, left bladder base, left pelvic sidewall; there are multiple left iliac and multiple hepatic lesions, highly suspicious for metastatic disease\".  The case was discussed with GI who recommend admission for management of constipation with plan for flex sig on Monday to reassess and obtain more biopsies.  Patient was admitted to internal medicine for continued workup and " "management.    Amount and/or Complexity of Data Reviewed  Labs: ordered. Decision-making details documented in ED Course.  Radiology: ordered.  Discussion of management or test interpretation with external provider(s): Senait Phillips PA-C (GI)    Risk  Prescription drug management.  Decision regarding hospitalization.        ED Course as of 04/26/25 1546   Sat Apr 26, 2025   1354 ALK PHOS(!): 670  Elevated but similar to prior (was 713 on 4/19/25)   1500 Per GI: \"I think we can admit here to address the constipation but then also could repeat a flex sig on Monday to reassess and do more biopsies. I don't think colorectal surg would necessarily want him transferred to [place] and do anything different in the short term without a more definitive tissue diagnosis especially with the concern for possible meta and the neg colonoscopy in March\"       Medications   sodium chloride 0.9 % bolus 1,000 mL (0 mL Intravenous Stopped 4/26/25 1334)   iohexol (OMNIPAQUE) 350 MG/ML injection (MULTI-DOSE) 100 mL (100 mL Intravenous Given 4/26/25 1305)       ED Risk Strat Scores                    No data recorded        SBIRT 20yo+      Flowsheet Row Most Recent Value   Initial Alcohol Screen: US AUDIT-C     1. How often do you have a drink containing alcohol? 0 Filed at: 04/26/2025 1105   2. How many drinks containing alcohol do you have on a typical day you are drinking?  0 Filed at: 04/26/2025 1105   3b. FEMALE Any Age, or MALE 65+: How often do you have 4 or more drinks on one occassion? 0 Filed at: 04/26/2025 1105   Audit-C Score 0 Filed at: 04/26/2025 1105   GARY: How many times in the past year have you...    Used an illegal drug or used a prescription medication for non-medical reasons? Never Filed at: 04/26/2025 1105                            History of Present Illness       Chief Complaint   Patient presents with    Constipation     Pt c/o constipation with last BM being 10 days ago. Pt reports \"trying everything\" with no " "relief. Hx prostate cancer.        Past Medical History:   Diagnosis Date    Prostate cancer (HCC)       Past Surgical History:   Procedure Laterality Date    KIDNEY SURGERY Left     removal      Family History   Problem Relation Age of Onset    No Known Problems Mother     No Known Problems Father       Social History     Tobacco Use    Smoking status: Never    Smokeless tobacco: Never   Vaping Use    Vaping status: Never Used   Substance Use Topics    Alcohol use: Not Currently    Drug use: Never      E-Cigarette/Vaping    E-Cigarette Use Never User       E-Cigarette/Vaping Substances    Nicotine No     THC No     CBD No     Flavoring No     Other No     Unknown No       I have reviewed and agree with the history as documented.     Patient is a 70-year-old male with a PMHx of HIV, HTN, kidney cancer (s/p left nephrectomy), prostate cancer (on Leupron q4mo) and newly diagnosed rectal mass, presenting to the ED for evaluation of constipation x10 days.  Patient states that he has had persistent constipation for the past 10 days.  He reports associated abdominal pain and distention.  He denies any fevers, chills, nausea or vomiting.  He has tried stool softeners, laxatives and a suppository without any relief.  Of note, patient has a newly diagnosed rectal mass that was seen on CT on 3/4/2025.  [facility] shows a \"locally advanced rectal carcinoma with regional and distant metastatic disease\".  Patient had a colonoscopy at [facility] that showed a polyp and irregularity in the rectum with no evidence of rectal mass.  The biopsy of the polyp and irregularity in the rectum were negative for malignancy; however, patient's colorectal surgeon recommended further workup of the rectal mass as they are still concerned for malignancy.          Review of Systems   Constitutional:  Negative for chills and fever.   HENT:  Negative for congestion, ear pain and sore throat.    Respiratory:  Negative for cough and shortness " of breath.    Cardiovascular:  Negative for chest pain, palpitations and leg swelling.   Gastrointestinal:  Positive for abdominal distention, abdominal pain and constipation. Negative for blood in stool, diarrhea, nausea and vomiting.   Genitourinary:  Negative for dysuria, flank pain and hematuria.   Musculoskeletal:  Negative for back pain and neck pain.   Skin:  Negative for rash.   Neurological:  Negative for dizziness, seizures, syncope and headaches.   All other systems reviewed and are negative.          Objective       ED Triage Vitals   Temperature Pulse Blood Pressure Respirations SpO2 Patient Position - Orthostatic VS   04/26/25 1102 04/26/25 1102 04/26/25 1102 04/26/25 1102 04/26/25 1102 04/26/25 1102   97.6 °F (36.4 °C) 68 (!) 180/73 20 96 % Sitting      Temp Source Heart Rate Source BP Location FiO2 (%) Pain Score    04/26/25 1102 04/26/25 1315 04/26/25 1102 -- --    Oral Monitor Left arm        Vitals      Date and Time Temp Pulse SpO2 Resp BP Pain Score FACES Pain Rating User   04/26/25 1400 -- 52 98 % 20 188/83 -- -- [facility]   04/26/25 1315 -- 57 99 % 20 203/88 -- -- [facility]   04/26/25 1102 97.6 °F (36.4 °C) 68 96 % 20 180/73 -- -- [place]            Physical Exam  Vitals and nursing note reviewed.   Constitutional:       General: He is awake. He is not in acute distress.     Appearance: Normal appearance. He is well-developed. He is not ill-appearing or diaphoretic.   HENT:      Head: Normocephalic and atraumatic.      Right Ear: External ear normal.      Left Ear: External ear normal.      Nose: Nose normal.      Mouth/Throat:      Lips: Pink.      Mouth: Mucous membranes are moist.   Eyes:      General: Lids are normal. No scleral icterus.     Conjunctiva/sclera: Conjunctivae normal.      Pupils: Pupils are equal, round, and reactive to light.   Cardiovascular:      Rate and Rhythm: Normal rate and regular rhythm.      Pulses: Normal pulses.           Radial pulses are 2+ on the right side and 2+ on the left  side.      Heart sounds: Normal heart sounds, S1 normal and S2 normal.   Pulmonary:      Effort: Pulmonary effort is normal. No accessory muscle usage.      Breath sounds: Normal breath sounds. No stridor. No decreased breath sounds, wheezing, rhonchi or rales.   Abdominal:      General: Abdomen is flat. Bowel sounds are normal. There is distension (mild).      Palpations: Abdomen is soft.      Tenderness: There is generalized abdominal tenderness. There is no right CVA tenderness, left CVA tenderness, guarding or rebound.   Musculoskeletal:      Cervical back: Full passive range of motion without pain, normal range of motion and neck supple. No signs of trauma. No pain with movement. Normal range of motion.      Right lower leg: No edema.      Left lower leg: No edema.   Lymphadenopathy:      Cervical: No cervical adenopathy.   Skin:     General: Skin is warm and dry.      Capillary Refill: Capillary refill takes less than 2 seconds.      Coloration: Skin is not cyanotic, jaundiced or pale.   Neurological:      Mental Status: He is alert and oriented to person, place, and time.      GCS: GCS eye subscore is 4. GCS verbal subscore is 5. GCS motor subscore is 6.      Cranial Nerves: No dysarthria or facial asymmetry.      Gait: Gait normal.   Psychiatric:         Attention and Perception: Attention normal.         Mood and Affect: Mood normal.         Speech: Speech normal.         Behavior: Behavior normal. Behavior is cooperative.         Results Reviewed       Procedure Component Value Units Date/Time    UA w Reflex to Microscopic w Reflex to Culture [[id number]] Collected: 04/26/25 1336    Lab Status: Final result Specimen: Urine, Clean Catch Updated: 04/26/25 1343     Color, UA Colorless     Clarity, UA Clear     Specific Gravity, UA 1.024     pH, UA 6.0     Leukocytes, UA Negative     Nitrite, UA Negative     Protein, UA Negative mg/dl      Glucose, UA Negative mg/dl      Ketones, UA Negative mg/dl       Urobilinogen, UA <2.0 mg/dl      Bilirubin, UA Negative     Occult Blood, UA Negative    Comprehensive metabolic panel [[id number]]  (Abnormal) Collected: 04/26/25 1234    Lab Status: Final result Specimen: Blood from Arm, Right Updated: 04/26/25 1254     Sodium 139 mmol/L      Potassium 4.6 mmol/L      Chloride 105 mmol/L      CO2 30 mmol/L      ANION GAP 4 mmol/L      BUN 17 mg/dL      Creatinine 1.00 mg/dL      Glucose 115 mg/dL      Calcium 9.6 mg/dL      AST 34 U/L      ALT 17 U/L      Alkaline Phosphatase 670 U/L      Total Protein 7.1 g/dL      Albumin 4.2 g/dL      Total Bilirubin 0.98 mg/dL      eGFR 76 ml/min/1.73sq m     Narrative:      National Kid[employer] Disease Foundation guidelines for Chronic Kidney Disease (CKD):     Stage 1 with normal or high GFR (GFR > 90 mL/min/1.73 square meters)    Stage 2 Mild CKD (GFR = 60-89 mL/min/1.73 square meters)    Stage 3A Moderate CKD (GFR = 45-59 mL/min/1.73 square meters)    Stage 3B Moderate CKD (GFR = 30-44 mL/min/1.73 square meters)    Stage 4 Severe CKD (GFR = 15-29 mL/min/1.73 square meters)    Stage 5 End Stage CKD (GFR <15 mL/min/1.73 square meters)  Note: GFR calculation is accurate only with a steady state creatinine    Lipase [[id number]]  (Normal) Collected: 04/26/25 1234    Lab Status: Final result Specimen: Blood from Arm, Right Updated: 04/26/25 1254     Lipase 16 u/L     CBC and differential [[id number]]  (Abnormal) Collected: 04/26/25 1234    Lab Status: Final result Specimen: Blood from Arm, Right Updated: 04/26/25 1241     WBC 7.00 Thousand/uL      RBC 3.79 Million/uL      Hemoglobin 11.4 g/dL      Hematocrit 34.8 %      MCV 92 fL      MCH 30.1 pg      MCHC 32.8 g/dL      RDW 12.3 %      MPV 8.4 fL      Platelets 211 Thousands/uL      nRBC 0 /100 WBCs      Segmented % 54 %      Immature Grans % 0 %      Lymphocytes % 34 %      Monocytes % 11 %      Eosinophils Relative 1 %      Basophils Relative 0 %      Absolute Neutrophils 3.68 Thousands/µL       Absolute Immature Grans 0.02 Thousand/uL      Absolute Lymphocytes 2.41 Thousands/µL      Absolute Monocytes 0.80 Thousand/µL      Eosinophils Absolute 0.06 Thousand/µL      Basophils Absolute 0.03 Thousands/µL             CT abdomen pelvis with contrast   Final Interpretation by Kyle Dash MD ( [id number])      1.  There is a large rectal mass, with direct invasion of the mesorectal fascia, left seminal vesicle, left bladder base, left pelvic sidewall. There are multiple left iliac and multiple hepatic lesions, highly suspicious for metastatic disease. The    prior outside comparison studies are not available for direct comparison at this time.   2.  There are also multiple sclerotic osseous lesions, consistent with metastatic disease. This may be separately related to the patient's prostate cancer.      Please refer to the report body for description of other incidental, chronic and/or benign findings.         Workstation performed: [id number]             Procedures    ED Medication and Procedure Management   Prior to Admission Medications   Prescriptions Last Dose Informant Patient Reported? Taking?   DARUNAVIR ETHANOLATE PO  Self Yes No   Si mg daily   DULoxetine (Cymbalta) 60 mg delayed release capsule  Self Yes No   Sig: Take 60 mg by mouth 2 (two) times a day   amitriptyline (ELAVIL) 50 mg tablet  Self Yes No   Sig: Take 50 mg by mouth   aspirin (ECOTRIN LOW STRENGTH) 81 mg EC tablet  Self Yes No   Sig: Take 81 mg by mouth daily   atorvastatin (LIPITOR) 40 mg tablet  Self Yes No   Sig: Take 40 mg by mouth daily   bictegravir-emtricitab-tenofovir alafenamide (Biktarvy) -25 MG tablet  Self Yes No   Sig: Take 1 tablet by mouth daily   folic acid (FOLVITE) 1 mg tablet  Self Yes No   Sig: Take 1,000 mcg by mouth daily   gabapentin (NEURONTIN) 300 mg capsule  Self Yes No   Sig: Take 300 mg by mouth Three times a day   lidocaine (LIDODERM) 5 %  Self Yes No   Sig: PLACE 1 PATCH ON THE SKIN DAILY.  REMOVE & DISCARD PATCH WITHIN 12 HOURS OR AS DIRECTED BY MD   metoprolol succinate (TOPROL-XL) 50 mg 24 hr tablet  Self Yes No   Sig: Take 50 mg by mouth daily   metoprolol succinate (TOPROL-XL) 50 mg 24 hr tablet  Self Yes No   Sig: Take 50 mg by mouth   oxyCODONE (ROXICODONE) 30 MG immediate release tablet  Self Yes No   Sig: Take 30 mg by mouth every 4 (four) hours as needed   rOPINIRole (REQUIP) 1 mg tablet  Self Yes No   Sig: Take 1 mg by mouth   traZODone (DESYREL) 150 mg tablet  Self Yes No   Sig: Take 150 mg by mouth   triamcinolone (KENALOG) 0.5 % cream  Self Yes No   Sig: Apply 1 application. topically 3 (three) times a day   zolpidem (AMBIEN) 10 mg tablet  Self Yes No   Sig: Take 10 mg by mouth daily as needed      Facility-Administered Medications: None     Patient's Medications   Discharge Prescriptions    No medications on file     No discharge procedures on file.  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

## 2025-04-26 NOTE — HOSPITAL COURSE
Caesar Hurd is a 70-year-old male with a PMHx of HIV on antiretroviral therapy, renal cell carcinoma (s/p left nephrectomy 2000), lymphoma (s/p chemotherapy 2008), prostate cancer IIIc (s/p adrogen deprivation therapy and radiation therapy 2019), HTN, and newly diagnosed rectal mass as per colonoscopy 3/21/2025 with [facility] colorectal surgery.  He is presenting to the ED for evaluation of constipation x10 days.  Workup in ER revealed large rectal mass with direct invasion into the mesorectal fascia, left seminal vesicle and bladder base as well as left pelvic sidewall with evidence of metastatic disease.  There is a large amount of stool but no evidence proximal dilatation to suggest complete obstruction.     The case was discussed with GI who recommend admission for management of constipation with plan for flex sig on Monday to reassess and obtain more biopsies, as patient is requesting second opinion for diagnosis/treatment.

## 2025-04-26 NOTE — ASSESSMENT & PLAN NOTE
"Identified earlier this year due to patient reporting sensation of something \"blocking him\" causing constipation. He underwent colonoscopy with biopsy 3/21/2025 through [facility] colorectal service. Biopsy was NOT consistent with colorectal cancer at that time.    CT abdomen/pelvis (4/26/25): There is a large rectal mass, with direct invasion of the mesorectal fascia, left seminal vesicle, left bladder base, left pelvic sidewall. There are multiple left iliac and multiple hepatic lesions, highly suspicious for metastatic disease. The prior outside comparison studies are not available for direct comparison at this time. There are also multiple sclerotic osseous lesions, consistent with metastatic disease. This may be separately related to the patient's prostate cancer.  GI consulted,  Plan for flex sig on Monday with additional biopsies  Additional recs pending workup  "

## 2025-04-27 PROBLEM — K76.9 LESION OF LIVER: Status: ACTIVE | Noted: 2025-04-27

## 2025-04-27 LAB
ANION GAP SERPL CALCULATED.3IONS-SCNC: 6 MMOL/L (ref 4–13)
BUN SERPL-MCNC: 13 MG/DL (ref 5–25)
CALCIUM SERPL-MCNC: 9.3 MG/DL (ref 8.4–10.2)
CHLORIDE SERPL-SCNC: 103 MMOL/L (ref 96–108)
CO2 SERPL-SCNC: 30 MMOL/L (ref 21–32)
CREAT SERPL-MCNC: 0.91 MG/DL (ref 0.6–1.3)
ERYTHROCYTE [DISTWIDTH] IN BLOOD BY AUTOMATED COUNT: 12.4 % (ref 11.6–15.1)
GFR SERPL CREATININE-BSD FRML MDRD: 86 ML/MIN/1.73SQ M
GLUCOSE SERPL-MCNC: 149 MG/DL (ref 65–140)
HCT VFR BLD AUTO: 32.1 % (ref 36.5–49.3)
HGB BLD-MCNC: 10.5 G/DL (ref 12–17)
MCH RBC QN AUTO: 29.9 PG (ref 26.8–34.3)
MCHC RBC AUTO-ENTMCNC: 32.7 G/DL (ref 31.4–37.4)
MCV RBC AUTO: 92 FL (ref 82–98)
PLATELET # BLD AUTO: 228 THOUSANDS/UL (ref 149–390)
PMV BLD AUTO: 9.4 FL (ref 8.9–12.7)
POTASSIUM SERPL-SCNC: 3.9 MMOL/L (ref 3.5–5.3)
RBC # BLD AUTO: 3.51 MILLION/UL (ref 3.88–5.62)
SODIUM SERPL-SCNC: 139 MMOL/L (ref 135–147)
WBC # BLD AUTO: 6.46 THOUSAND/UL (ref 4.31–10.16)

## 2025-04-27 PROCEDURE — 99232 SBSQ HOSP IP/OBS MODERATE 35: CPT | Performed by: PHYSICIAN ASSISTANT

## 2025-04-27 PROCEDURE — 99223 1ST HOSP IP/OBS HIGH 75: CPT | Performed by: INTERNAL MEDICINE

## 2025-04-27 PROCEDURE — 85027 COMPLETE CBC AUTOMATED: CPT | Performed by: PHYSICIAN ASSISTANT

## 2025-04-27 PROCEDURE — 80048 BASIC METABOLIC PNL TOTAL CA: CPT | Performed by: PHYSICIAN ASSISTANT

## 2025-04-27 RX ORDER — POLYETHYLENE GLYCOL 3350 17 G/17G
238 POWDER, FOR SOLUTION ORAL ONCE
Status: COMPLETED | OUTPATIENT
Start: 2025-04-27 | End: 2025-04-27

## 2025-04-27 RX ORDER — LABETALOL HYDROCHLORIDE 5 MG/ML
10 INJECTION, SOLUTION INTRAVENOUS ONCE
Status: COMPLETED | OUTPATIENT
Start: 2025-04-27 | End: 2025-04-27

## 2025-04-27 RX ORDER — HYDRALAZINE HYDROCHLORIDE 20 MG/ML
5 INJECTION INTRAMUSCULAR; INTRAVENOUS EVERY 4 HOURS PRN
Status: DISCONTINUED | OUTPATIENT
Start: 2025-04-27 | End: 2025-04-28

## 2025-04-27 RX ADMIN — METOPROLOL SUCCINATE 50 MG: 50 TABLET, EXTENDED RELEASE ORAL at 09:21

## 2025-04-27 RX ADMIN — BICTEGRAVIR SODIUM, EMTRICITABINE, AND TENOFOVIR ALAFENAMIDE FUMARATE 1 TABLET: 50; 200; 25 TABLET ORAL at 09:21

## 2025-04-27 RX ADMIN — POLYETHYLENE GLYCOL 3350 238 G: 17 POWDER, FOR SOLUTION ORAL at 13:07

## 2025-04-27 RX ADMIN — Medication 10 MG: at 00:37

## 2025-04-27 RX ADMIN — GABAPENTIN 300 MG: 300 CAPSULE ORAL at 21:09

## 2025-04-27 RX ADMIN — ZOLPIDEM TARTRATE 10 MG: 5 TABLET, FILM COATED ORAL at 02:29

## 2025-04-27 RX ADMIN — HEPARIN SODIUM 5000 UNITS: 5000 INJECTION INTRAVENOUS; SUBCUTANEOUS at 14:25

## 2025-04-27 RX ADMIN — OXYCODONE HYDROCHLORIDE 20 MG: 10 TABLET ORAL at 11:30

## 2025-04-27 RX ADMIN — SENNOSIDES AND DOCUSATE SODIUM 2 TABLET: 50; 8.6 TABLET ORAL at 17:03

## 2025-04-27 RX ADMIN — ATORVASTATIN CALCIUM 40 MG: 40 TABLET, FILM COATED ORAL at 16:00

## 2025-04-27 RX ADMIN — SODIUM CHLORIDE, SODIUM LACTATE, POTASSIUM CHLORIDE, AND CALCIUM CHLORIDE 75 ML/HR: .6; .31; .03; .02 INJECTION, SOLUTION INTRAVENOUS at 04:53

## 2025-04-27 RX ADMIN — FOLIC ACID 1000 MCG: 1 TABLET ORAL at 09:21

## 2025-04-27 RX ADMIN — OXYCODONE HYDROCHLORIDE 20 MG: 10 TABLET ORAL at 16:00

## 2025-04-27 RX ADMIN — HYDRALAZINE HYDROCHLORIDE 5 MG: 20 INJECTION INTRAMUSCULAR; INTRAVENOUS at 19:31

## 2025-04-27 RX ADMIN — OXYCODONE HYDROCHLORIDE 20 MG: 10 TABLET ORAL at 20:38

## 2025-04-27 RX ADMIN — HEPARIN SODIUM 5000 UNITS: 5000 INJECTION INTRAVENOUS; SUBCUTANEOUS at 05:02

## 2025-04-27 RX ADMIN — SODIUM CHLORIDE, SODIUM LACTATE, POTASSIUM CHLORIDE, AND CALCIUM CHLORIDE 75 ML/HR: .6; .31; .03; .02 INJECTION, SOLUTION INTRAVENOUS at 19:31

## 2025-04-27 RX ADMIN — OXYCODONE HYDROCHLORIDE 20 MG: 10 TABLET ORAL at 04:31

## 2025-04-27 RX ADMIN — HEPARIN SODIUM 5000 UNITS: 5000 INJECTION INTRAVENOUS; SUBCUTANEOUS at 21:09

## 2025-04-27 NOTE — PROGRESS NOTES
"Progress Note - Hospitalist   Name: Caesar Hurd 70 y.o. male I MRN: [id number]  Unit/Bed#: -[facility] I Date of Admission: 4/26/2025   Date of Service: 4/27/2025 I Hospital Day: 1    Assessment & Plan  Constipation  Presents with 10 days of constipation, has been battling for months and was found to have a perirectal mass in January of this year.   Chronic constipation at this point, complicated by rectal mass  Aggressive bowel regimen, monitor stool output   GI consulted  Rectal mass  Identified earlier this year due to patient reporting sensation of something \"blocking him\" causing constipation. He underwent colonoscopy with biopsy 3/21/2025 through [facility] colorectal service. Biopsy was NOT consistent with colorectal cancer at that time.    CT abdomen/pelvis (4/26/25): There is a large rectal mass, with direct invasion of the mesorectal fascia, left seminal vesicle, left bladder base, left pelvic sidewall. There are multiple left iliac and multiple hepatic lesions, highly suspicious for metastatic disease. The prior outside comparison studies are not available for direct comparison at this time. There are also multiple sclerotic osseous lesions, consistent with metastatic disease. This may be separately related to the patient's prostate cancer.  GI consulted,  Plan for flex sig on Monday with additional biopsies  Additional recs pending workup  Essential hypertension  Chronic, on metoprolol which we will continue  Monitor VS per unit protocol   HIV infection (HCC)  History of, on Biktarvy and endorses compliance  Will continue HAART  Stage 3a chronic kidney disease (HCC)  Cr baseline 1.2-1.3, presenting Cr 1.00  Will monitor   Cancer of multiple primary sites (HCC)  History of lymphoma, renal cell carcinoma, prostate adenocarcinoma; now with rectal mass concerning for cancer  Followed by [facility] oncology teams, however is requesting second opinion     VTE Pharmacologic Prophylaxis: VTE Score: 5 High Risk (Score >/= " "5) - Pharmacological DVT Prophylaxis Ordered: heparin. Sequential Compression Devices Ordered.    Mobility:   Basic Mobility Inpatient Raw Score: 24  JH-HLM Goal: 8: Walk 250 feet or more  JH-HLM Achieved: 6: Walk 10 steps or more  JH-HLM Goal NOT achieved. Continue with multidisciplinary rounding and encourage appropriate mobility to improve upon JH-HLM goals.    Patient Centered Rounds: I performed bedside rounds with nursing staff today.   Discussions with Specialists or Other Care Team Provider: await GI eval    Education and Discussions with Family / Patient: Patient declined call to .  Reports family is not aware of all of his diagnoses, so defers updates    Current Length of Stay: 1 day(s)  Current Patient Status: Inpatient   Certification Statement: The patient will continue to require additional inpatient hospital stay due to flex sig tomorrow, constipation management, possible inpatient  oncology evaluation  Discharge Plan: Anticipate discharge in 24-48 hrs to home.    Code Status: Level 1 - Full Code    Subjective   Patient doing well, reports \"feels better\" but had deferred additional enema yesterday, preferring to wait for bowel prep today. No nausea or vomiting. No pain at this time.     Objective :  Temp:  [97.6 °F (36.4 °C)-98.1 °F (36.7 °C)] 98.1 °F (36.7 °C)  HR:  [52-68] 58  BP: (157-203)/(71-88) 175/81  Resp:  [18-20] 18  SpO2:  [96 %-99 %] 97 %  O2 Device: None (Room air)    Body mass index is 26.47 kg/m².     Input and Output Summary (last 24 hours):     Intake/Output Summary (Last 24 hours) at 4/27/2025 0838  Last data filed at 4/26/2025 2343  Gross per 24 hour   Intake 1180 ml   Output 600 ml   Net 580 ml       Physical Exam  Vitals and nursing note reviewed.   Constitutional:       General: He is sleeping. He is not in acute distress.     Appearance: Normal appearance. He is well-developed. He is not ill-appearing or toxic-appearing.   HENT:      Head: Normocephalic and " atraumatic.   Cardiovascular:      Rate and Rhythm: Normal rate.   Pulmonary:      Effort: Pulmonary effort is normal. No respiratory distress.   Abdominal:      General: Bowel sounds are normal. There is no distension.      Palpations: Abdomen is soft.      Tenderness: There is no abdominal tenderness. There is no guarding.   Skin:     Coloration: Skin is not jaundiced or pale.   Neurological:      Mental Status: He is oriented to person, place, and time and easily aroused.   Psychiatric:         Mood and Affect: Mood normal.         Behavior: Behavior normal. Behavior is cooperative.           Lines/Drains:              Lab Results: I have reviewed the following results:   Results from last 7 days   Lab Units 04/27/25  0515 04/26/25  1234   WBC Thousand/uL 6.46 7.00   HEMOGLOBIN g/dL 10.5* 11.4*   HEMATOCRIT % 32.1* 34.8*   PLATELETS Thousands/uL 228 211   SEGS PCT %  --  54   LYMPHO PCT %  --  34   MONO PCT %  --  11   EOS PCT %  --  1     Results from last 7 days   Lab Units 04/27/25  0515 04/26/25  1234   SODIUM mmol/L 139 139   POTASSIUM mmol/L 3.9 4.6   CHLORIDE mmol/L 103 105   CO2 mmol/L 30 30   BUN mg/dL 13 17   CREATININE mg/dL 0.91 1.00   ANION GAP mmol/L 6 4   CALCIUM mg/dL 9.3 9.6   ALBUMIN g/dL  --  4.2   TOTAL BILIRUBIN mg/dL  --  0.98   ALK PHOS U/L  --  670*   ALT U/L  --  17   AST U/L  --  34   GLUCOSE RANDOM mg/dL 149* 115                       Recent Cultures (last 7 days):         Imaging Results Review: No pertinent imaging studies reviewed.  Other Study Results Review: No additional pertinent studies reviewed.    Last 24 Hours Medication List:     Current Facility-Administered Medications:     Abiraterone Acetate TABS 1,000 mg, Daily    acetaminophen (TYLENOL) tablet 650 mg, Q6H PRN    aluminum-magnesium hydroxide-simethicone (MAALOX) oral suspension 30 mL, Q6H PRN    atorvastatin (LIPITOR) tablet 40 mg, Daily With Dinner    bictegravir-emtricitab-tenofovir alafenamide (BIKTARVY) -25 MG  tablet 1 tablet, Daily With Breakfast    folic acid (FOLVITE) tablet 1,000 mcg, Daily    gabapentin (NEURONTIN) capsule 300 mg, HS    heparin (porcine) subcutaneous injection 5,000 Units, Q8H MELINDA    lactated ringers infusion, Continuous, Last Rate: 75 mL/hr (04/27/25 5763)    magnesium citrate (CITROMA) oral solution 296 mL, Once    metoprolol succinate (TOPROL-XL) 24 hr tablet 50 mg, Daily    mineral oil enema 1 enema, Once    ondansetron (ZOFRAN) injection 4 mg, Q6H PRN    oxyCODONE (ROXICODONE) immediate release tablet 20 mg, Q4H PRN    polyethylene glycol (MIRALAX) packet 17 g, BID    senna-docusate sodium (SENOKOT S) 8.6-50 mg per tablet 2 tablet, BID    zolpidem (AMBIEN) tablet 10 mg, Daily PRN    Administrative Statements   Today, Patient Was Seen By: Rossana Hughes PA-C  I have spent a total time of 35 minutes in caring for this patient on the day of the visit/encounter including Patient and family education, Impressions, Counseling / Coordination of care, Documenting in the medical record, Reviewing/placing orders in the medical record (including tests, medications, and/or procedures), and Communicating with other healthcare professionals .    **Please Note: This note may have been constructed using a voice recognition system.**

## 2025-04-27 NOTE — ASSESSMENT & PLAN NOTE
- He presented primarily due to constipation which has been ongoing for months with known abdominal pelvic soft tissue mass and lymphadenopathy with prior workup at [facility] including a colonoscopy on 3/21/2025 showing no rectal mass but irregular tissue on pathology showing benign colon mucosa  -Suspect the masslike findings on imaging is extraluminal and is not a primary rectal cancer and could be related to metastatic prostate cancer given his history  -We will plan for flex sig tomorrow to reassess the rectum and take biopsies to ensure there is no intraluminal rectal mass  -I will give him a bowel prep to drink slowly today to help with his constipation primarily will plan for a tapwater enema tomorrow morning prior to the flex sig  -If the flexible sigmoidoscopy is unremarkable, would consider an IR biopsy of the pelvic mass and lymphadenopathy if accessible

## 2025-04-27 NOTE — UTILIZATION REVIEW
"Initial Clinical Review    Admission: Date/Time/Statement:   Admission Orders (From admission, onward)       Ordered        04/26/25 1505  INPATIENT ADMISSION  Once                          Orders Placed This Encounter   Procedures    INPATIENT ADMISSION     Standing Status:   Standing     Number of Occurrences:   1     Level of Care:   Med Surg [16]     Estimated length of stay:   More than 2 Midnights     Certification:   I certify that inpatient services are medically necessary for this patient for a duration of greater than two midnights. See H&P and MD Progress Notes for additional information about the patient's course of treatment.     ED Arrival Information       Expected   -    Arrival   4/26/2025 10:47    Acuity   Urgent              Means of arrival   Walk-In    Escorted by   Self    Service   Hospitalist    Admission type   Emergency              Arrival complaint   CONSTIPATION (CANCER PT)             Chief Complaint   Patient presents with    Constipation     Pt c/o constipation with last BM being 10 days ago. Pt reports \"trying everything\" with no relief. Hx prostate cancer.        Initial Presentation: 70 y.o. male presents to ED from home with constipation for past 10 days.  Ed work up shows large rectal mass, evidence of metastatic disease.  Large amount stool but no obstruction.  PMH is  HIV, RCC, S/P L nephrectomy, lymphoma, prostate cancer  and newly diagnosed rectal mass at    ( 3/25).  Admit  Ip with Constipation, Rectal  mass and plan is  Gi consult,  monitor stool output, enemas,  and monitor labs.        Anticipated Length of Stay/Certification Statement: Patient will be admitted on an inpatient basis with an anticipated length of stay of greater than 2 midnights secondary to obstipation, rectal mass .       Date:    4/27   Day 2:   Feels  improved.  Deferred additional enemas, wait  bowel prep.  Plan flex sigmoid  Mon  4/28.      Date:    4/28  Day 3: Has surpassed a 2nd midnight with " active treatments and services.  Flex  sigmoid  IMPRESSION:  No mucosal lesions seen in the left colon  Performed forceps biopsies in the rectum    Needs  IR   consult for  Bx  hepatic lesions on CT scan per  Gi consult    Continue pain  control as needed.  Continue bowel regimen.          ED Treatment-Medication Administration from 04/26/2025 1047 to 04/26/2025 1713         Date/Time Order Dose Route Action     04/26/2025 1234 sodium chloride 0.9 % bolus 1,000 mL 1,000 mL Intravenous New Bag     04/26/2025 1305 iohexol (OMNIPAQUE) 350 MG/ML injection (MULTI-DOSE) 100 mL 100 mL Intravenous Given            Scheduled Medications:  [Held by provider] Abiraterone Acetate, 1,000 mg, Oral, Daily  atorvastatin, 40 mg, Oral, Daily With Dinner  bictegravir-emtricitab-tenofovir alafenamide, 1 tablet, Oral, Daily With Breakfast  folic acid, 1,000 mcg, Oral, Daily  gabapentin, 300 mg, Oral, HS  heparin (porcine), 5,000 Units, Subcutaneous, Q8H MELINDA  magnesium citrate, 296 mL, Oral, Once  metoprolol succinate, 50 mg, Oral, Daily  mineral oil, 1 enema, Rectal, Once  polyethylene glycol, 238 g, Oral, Once  senna-docusate sodium, 2 tablet, Oral, BID      Continuous IV Infusions:  lactated ringers, 75 mL/hr, Intravenous, Continuous      PRN Meds:  acetaminophen, 650 mg, Oral, Q6H PRN  aluminum-magnesium hydroxide-simethicone, 30 mL, Oral, Q6H PRN  ondansetron, 4 mg, Intravenous, Q6H PRN  oxyCODONE, 20 mg, Oral, Q4H PRN  zolpidem, 10 mg, Oral, Daily PRN      ED Triage Vitals   Temperature Pulse Respirations Blood Pressure SpO2 Pain Score   04/26/25 1102 04/26/25 1102 04/26/25 1102 04/26/25 1102 04/26/25 1102 04/26/25 1819   97.6 °F (36.4 °C) 68 20 (!) 180/73 96 % 7     Weight (last 2 days)       Date/Time Weight    04/26/25 17:24:27 74.4 (164.02)    04/26/25 1102 74.4 (164.02)            Vital Signs (last 3 days)       Date/Time Temp Pulse Resp BP MAP (mmHg) SpO2 O2 Device Patient Position - Orthostatic VS Pain    04/27/25 6165 -- --  -- -- -- -- -- -- 7 04/27/25 0900 -- -- -- -- -- -- -- -- No Pain    04/27/25 06:14:57 -- 58 -- 175/81 112 97 % -- -- --    04/27/25 0431 -- -- -- -- -- -- -- -- 7 04/27/25 04:27:04 -- 62 -- 179/81 114 97 % -- -- --    04/27/25 01:24:08 -- 59 -- 177/76 110 97 % -- -- --    04/27/25 0021 -- -- -- 180/85 -- 97 % -- -- --    04/26/25 23:38:50 -- 59 18 185/80 115 97 % -- -- --    04/26/25 2227 98.1 °F (36.7 °C) 61 18 189/80 -- 98 % None (Room air) -- --    04/26/25 2120 -- -- -- -- -- -- -- -- 7 04/1955 -- -- -- -- -- -- -- -- No Pain    04/26/25 19:36:16 -- 68 -- 157/71 100 97 % -- -- --    04/26/25 1819 -- -- -- -- -- -- -- -- 7 04/26/25 17:24:27 97.9 °F (36.6 °C) 56 18 195/83 120 99 % -- -- --    04/26/25 1400 -- 52 20 188/83 119 98 % None (Room air) Lying --    04/26/25 1315 -- 57 20 203/88 127 99 % None (Room air) Lying --    04/26/25 1102 97.6 °F (36.4 °C) 68 20 180/73 105 96 % None (Room air) Sitting --              Pertinent Labs/Diagnostic Test Results:   Radiology:  CT abdomen pelvis with contrast   Final Interpretation by Kyle Dash MD (04/26 3161)      1.  There is a large rectal mass, with direct invasion of the mesorectal fascia, left seminal vesicle, left bladder base, left pelvic sidewall. There are multiple left iliac and multiple hepatic lesions, highly suspicious for metastatic disease. The    prior outside comparison studies are not available for direct comparison at this time.   2.  There are also multiple sclerotic osseous lesions, consistent with metastatic disease. This may be separately related to the patient's prostate cancer.      Please refer to the report body for description of other incidental, chronic and/or benign findings.         Workstation performed: [id number]           Cardiology:      Results from last 7 days   Lab Units 04/27/25  0515 04/26/25  1234   WBC Thousand/uL 6.46 7.00   HEMOGLOBIN g/dL 10.5* 11.4*   HEMATOCRIT % 32.1* 34.8*   PLATELETS  Thousands/uL 228 211   TOTAL NEUT ABS Thousands/µL  --  3.68         Results from last 7 days   Lab Units 04/27/25  0515 04/26/25  1234   SODIUM mmol/L 139 139   POTASSIUM mmol/L 3.9 4.6   CHLORIDE mmol/L 103 105   CO2 mmol/L 30 30   ANION GAP mmol/L 6 4   BUN mg/dL 13 17   CREATININE mg/dL 0.91 1.00   EGFR ml/min/1.73sq m 86 76   CALCIUM mg/dL 9.3 9.6     Results from last 7 days   Lab Units 04/26/25  1234   AST U/L 34   ALT U/L 17   ALK PHOS U/L 670*   TOTAL PROTEIN g/dL 7.1   ALBUMIN g/dL 4.2   TOTAL BILIRUBIN mg/dL 0.98         Results from last 7 days   Lab Units 04/27/25  0515 04/26/25  1234   GLUCOSE RANDOM mg/dL 149* 115           Results from last 7 days   Lab Units 04/26/25  1234   LIPASE u/L 16                 Results from last 7 days   Lab Units 04/26/25  1336   CLARITY UA  Clear   COLOR UA  Colorless   SPEC GRAV UA  1.024   PH UA  6.0   GLUCOSE UA mg/dl Negative   KETONES UA mg/dl Negative   BLOOD UA  Negative   PROTEIN UA mg/dl Negative   NITRITE UA  Negative   BILIRUBIN UA  Negative   UROBILINOGEN UA (BE) mg/dl <2.0   LEUKOCYTES UA  Negative               Present on Admission:   Constipation   Rectal mass   Essential hypertension   HIV infection (HCC)   Stage 3a chronic kidney disease (HCC)   Cancer of multiple primary sites (HCC)      Admitting Diagnosis: Bone lesion [M89.9]  Abdominal pain [R10.9]  Constipation [K59.00]  Rectal mass [K62.89]  Hepatic lesion [K76.9]  Age/Sex: 70 y.o. male    Network Utilization Review Department  ATTENTION: Please call with any questions or concerns to [phone] and carefully listen to the prompts so that you are directed to the right person. All voicemails are confidential.   For Discharge needs, contact Care Management DC Support Team at [phone] opt. [phone]  Send all requests for admission clinical reviews, approved or denied determinations and any other requests to dedicated fax number below belonging to the campus where the patient is receiving treatment. List  of dedicated fax numbers for the Facilities:  FACILITY NAME UR FAX NUMBER   ADMISSION DENIALS (Administrative/Medical Necessity) [phone]   DISCHARGE SUPPORT TEAM (NETWORK) [phone]   PARENT CHILD HEALTH (Maternity/NICU/Pediatrics) [phone]   [facility] [phone]   [facility] [phone]   [facility] [phone]   [facility] [phone]   Critical access hospital [phone]   [facility] [phone]   [facility] [phone]   [facility] [phone]   [facility] [phone]   [facility] [phone]   [facility] [phone]   [facility] [phone]

## 2025-04-27 NOTE — NURSING NOTE
Pt refused for his continuous Iv to be administered. He states the machine is annoying him. Offered him new IV placement. Pt declined. SLIM aware.

## 2025-04-27 NOTE — ASSESSMENT & PLAN NOTE
"Identified earlier this year due to patient reporting sensation of something \"blocking him\" causing constipation. He underwent colonoscopy with biopsy 3/21/2025 through [facility] colorectal service. Biopsy was NOT consistent with colorectal cancer at that time.    CT abdomen/pelvis (4/26/25): There is a large rectal mass, with direct invasion of the mesorectal fascia, left seminal vesicle, left bladder base, left pelvic sidewall. There are multiple left iliac and multiple hepatic lesions, highly suspicious for metastatic disease. The prior outside comparison studies are not available for direct comparison at this time. There are also multiple sclerotic osseous lesions, consistent with metastatic disease. This may be separately related to the patient's prostate cancer.  GI consulted,  Plan for flex sig on Monday with additional biopsies  Additional recs pending workup  "

## 2025-04-27 NOTE — PLAN OF CARE
Problem: PAIN - ADULT  Goal: Verbalizes/displays adequate comfort level or baseline comfort level  Description: Interventions:- Encourage patient to monitor pain and request assistance- Assess pain using appropriate pain scale- Administer analgesics based on type and severity of pain and evaluate response- Implement non-pharmacological measures as appropriate and evaluate response- Consider cultural and social influences on pain and pain management- Notify physician/advanced practitioner if interventions unsuccessful or patient reports new pain  Outcome: Progressing     Problem: INFECTION - ADULT  Goal: Absence or prevention of progression during hospitalization  Description: INTERVENTIONS:- Assess and monitor for signs and symptoms of infection- Monitor lab/diagnostic results- Monitor all insertion sites, i.e. indwelling lines, tubes, and drains- Monitor endotracheal if appropriate and nasal secretions for changes in amount and color- [place] appropriate cooling/warming therapies per order- Administer medications as ordered- Instruct and encourage patient and family to use good hand hygiene technique- Identify and instruct in appropriate isolation precautions for identified infection/condition  Outcome: Progressing  Goal: Absence of fever/infection during neutropenic period  Description: INTERVENTIONS:- Monitor WBC  Outcome: Progressing     Problem: SAFETY ADULT  Goal: Patient will remain free of falls  Description: INTERVENTIONS:- Educate patient/family on patient safety including physical limitations- Instruct patient to call for assistance with activity - Consult OT/PT to assist with strengthening/mobility - Keep Call bell within reach- Keep bed low and locked with side rails adjusted as appropriate- Keep care items and personal belongings within reach- Initiate and maintain comfort rounds- Make Fall Risk Sign visible to staff- Offer Toileting every 2 Hours, in advance of need- Initiate/Maintain 2alarm-  Obtain necessary fall risk management equipment: 2- Apply yellow socks and bracelet for high fall risk patients- Consider moving patient to room near nurses station  Outcome: Progressing  Goal: Maintain or return to baseline ADL function  Description: INTERVENTIONS:-  Assess patient's ability to carry out ADLs; assess patient's baseline for ADL function and identify physical deficits which impact ability to perform ADLs (bathing, care of mouth/teeth, toileting, grooming, dressing, etc.)- Assess/evaluate cause of self-care deficits - Assess range of motion- Assess patient's mobility; develop plan if impaired- Assess patient's need for assistive devices and provide as appropriate- Encourage maximum independence but intervene and supervise when necessary- Involve family in performance of ADLs- Assess for home care needs following discharge - Consider OT consult to assist with ADL evaluation and planning for discharge- Provide patient education as appropriate  Outcome: Progressing  Goal: Maintains/Returns to pre admission functional level  Description: INTERVENTIONS:- Perform AM-PAC 6 Click Basic Mobility/ Daily Activity assessment daily.- Set and communicate daily mobility goal to care team and patient/family/caregiver. - Collaborate with rehabilitation services on mobility goals if consulted- Perform Range of Motion 2 times a day.- Reposition patient every 2 hours.- Dangle patient 2 times a day- Stand patient 2 times a day- Ambulate patient 2 times a day- Out of bed to chair 2 times a day - Out of bed for meals 2 times a day- Out of bed for toileting- Record patient progress and toleration of activity level   Outcome: Progressing     Problem: DISCHARGE PLANNING  Goal: Discharge to home or other facility with appropriate resources  Description: INTERVENTIONS:- Identify barriers to discharge w/patient and caregiver- Arrange for needed discharge resources and transportation as appropriate- Identify discharge learning  needs (meds, wound care, etc.)- Arrange for interpretive services to assist at discharge as needed- Refer to Case Management Department for coordinating discharge planning if the patient needs post-hospital services based on physician/advanced practitioner order or complex needs related to functional status, cognitive ability, or social support system  Outcome: Progressing     Problem: Knowledge Deficit  Goal: Patient/family/caregiver demonstrates understanding of disease process, treatment plan, medications, and discharge instructions  Description: Complete learning assessment and assess knowledge base.Interventions:- Provide teaching at level of understanding- Provide teaching via preferred learning methods  Outcome: Progressing

## 2025-04-27 NOTE — CONSULTS
Consultation - Gastroenterology   Name: Caesar Hurd 70 y.o. male I MRN: [id number]  Unit/Bed#: -01 I Date of Admission: 4/26/2025   Date of Service: 4/27/2025 I Hospital Day: 1   Inpatient consult to gastroenterology  Consult performed by: Senait Phillips PA-C  Consult ordered by: Rossana Hughes PA-C        Physician Requesting Evaluation: Cornelio Delgado DO   Reason for Evaluation / Principal Problem: Rectal Mass, Constipation    Assessment & Plan  Constipation  - He presented primarily due to constipation which has been ongoing for months with known abdominal pelvic soft tissue mass and lymphadenopathy with prior workup at [facility] including a colonoscopy on 3/21/2025 showing no rectal mass but irregular tissue on pathology showing benign colon mucosa  -Suspect the masslike findings on imaging is extraluminal and is not a primary rectal cancer and could be related to metastatic prostate cancer given his history  -We will plan for flex sig tomorrow to reassess the rectum and take biopsies to ensure there is no intraluminal rectal mass  -I will give him a bowel prep to drink slowly today to help with his constipation primarily will plan for a tapwater enema tomorrow morning prior to the flex sig  -If the flexible sigmoidoscopy is unremarkable, would consider an IR biopsy of the pelvic mass and lymphadenopathy if accessible  Rectal mass    Lesion of liver  - He will need an IR biopsy of the hepatic lesions seen on CT    The patient was seen with Dr. Hare.    History of Present Illness   HPI:  Caesar Hurd is a 70 y.o. male who presented yesterday due to constipation over the past 1 month at least.  He has been following with [facility] for the past several months and initially saw colorectal surgery back in January and she was referred for perirectal mass and periprostatic mass and was reporting constipation ongoing for months with a sensation of something blocking him.  He had a CAT scan  showing left perirectal soft tissue mass with lymphadenopathy with concerns for multiple primary cancers.  He eventually had a colonoscopy on March 21 with colorectal at [place], Dr. Marsh, which showed a 3 mm transverse colon polyp, no obvious rectal mass but irregularity in the rectum status post biopsies, and diverticulosis.  Pathology showed benign colorectal mucosa negative for dysplasia or malignancy. He was recommended to have an IR biopsy of the abnormal findings in the pelvis on imaging.  He saw hematology and deferred a liver biopsy that was recommended.  He has not had any further biopsies or testing done. He had a small BM this morning but still reports lower abdominal pain and sensation of constipation.    Review of Systems  Historical Information   Past Medical History:   Diagnosis Date    Prostate cancer (HCC)      Past Surgical History:   Procedure Laterality Date    KIDNEY SURGERY Left     removal     Social History     Tobacco Use    Smoking status: Never    Smokeless tobacco: Never   Vaping Use    Vaping status: Never Used   Substance and Sexual Activity    Alcohol use: Not Currently    Drug use: Never    Sexual activity: Not on file     E-Cigarette/Vaping    E-Cigarette Use Never User      E-Cigarette/Vaping Substances    Nicotine No     THC No     CBD No     Flavoring No     Other No     Unknown No      Family history non-contributory    Current Facility-Administered Medications:     [Held by provider] Abiraterone Acetate TABS 1,000 mg, Daily    acetaminophen (TYLENOL) tablet 650 mg, Q6H PRN    aluminum-magnesium hydroxide-simethicone (MAALOX) oral suspension 30 mL, Q6H PRN    atorvastatin (LIPITOR) tablet 40 mg, Daily With Dinner    bictegravir-emtricitab-tenofovir alafenamide (BIKTARVY) -25 MG tablet 1 tablet, Daily With Breakfast    folic acid (FOLVITE) tablet 1,000 mcg, Daily    gabapentin (NEURONTIN) capsule 300 mg, HS    heparin (porcine) subcutaneous injection 5,000 Units,  Q8H MELINDA    lactated ringers infusion, Continuous, Last Rate: 75 mL/hr (04/27/25 0453)    magnesium citrate (CITROMA) oral solution 296 mL, Once    metoprolol succinate (TOPROL-XL) 24 hr tablet 50 mg, Daily    mineral oil enema 1 enema, Once    ondansetron (ZOFRAN) injection 4 mg, Q6H PRN    oxyCODONE (ROXICODONE) immediate release tablet 20 mg, Q4H PRN    polyethylene glycol (GLYCOLAX) bowel prep 238 g, Once    senna-docusate sodium (SENOKOT S) 8.6-50 mg per tablet 2 tablet, BID    zolpidem (AMBIEN) tablet 10 mg, Daily PRN    Objective :  Temp:  [97.9 °F (36.6 °C)-98.1 °F (36.7 °C)] 98.1 °F (36.7 °C)  HR:  [52-68] 58  BP: (157-203)/(71-88) 175/81  Resp:  [18-20] 18  SpO2:  [97 %-99 %] 97 %  O2 Device: None (Room air)    Physical Exam  General- A+O x3, no distress  Abdomen- Non-distended, soft, BS active, (+) TTP in the lower abdomen      Lab Results: I have reviewed the following results:CBC/BMP:   .     04/27/25  0515   WBC 6.46   HGB 10.5*   HCT 32.1*      SODIUM 139   K 3.9      CO2 30   BUN 13   CREATININE 0.91   GLUC 149*    , Creatinine Clearance: Estimated Creatinine Clearance: 70.1 mL/min (by C-G formula based on SCr of 0.91 mg/dL)., LFTs: No new results in last 24 hours. , PTT/INR:No new results in last 24 hours.     Imaging Results Review: I reviewed radiology reports from this admission including: CT abdomen/pelvis and procedure reports.  Other Study Results Review: No additional pertinent studies reviewed.

## 2025-04-27 NOTE — ASSESSMENT & PLAN NOTE
History of lymphoma, renal cell carcinoma, prostate adenocarcinoma; now with rectal mass concerning for cancer  Followed [facility] teams, however is requesting second opinion

## 2025-04-28 ENCOUNTER — ANESTHESIA (INPATIENT)
Dept: GASTROENTEROLOGY | Facility: HOSPITAL | Age: 70
DRG: 392 | End: 2025-04-28
Payer: COMMERCIAL

## 2025-04-28 ENCOUNTER — APPOINTMENT (INPATIENT)
Dept: GASTROENTEROLOGY | Facility: HOSPITAL | Age: 70
DRG: 392 | End: 2025-04-28
Attending: PHYSICIAN ASSISTANT
Payer: COMMERCIAL

## 2025-04-28 ENCOUNTER — ANESTHESIA EVENT (INPATIENT)
Dept: GASTROENTEROLOGY | Facility: HOSPITAL | Age: 70
DRG: 392 | End: 2025-04-28
Payer: COMMERCIAL

## 2025-04-28 PROCEDURE — 99232 SBSQ HOSP IP/OBS MODERATE 35: CPT | Performed by: PHYSICIAN ASSISTANT

## 2025-04-28 PROCEDURE — 0DBP8ZX EXCISION OF RECTUM, VIA NATURAL OR ARTIFICIAL OPENING ENDOSCOPIC, DIAGNOSTIC: ICD-10-PCS | Performed by: RADIOLOGY

## 2025-04-28 PROCEDURE — 88305 TISSUE EXAM BY PATHOLOGIST: CPT | Performed by: PATHOLOGY

## 2025-04-28 PROCEDURE — 45331 SIGMOIDOSCOPY AND BIOPSY: CPT | Performed by: INTERNAL MEDICINE

## 2025-04-28 PROCEDURE — 99449 NTRPROF PH1/NTRNET/EHR 31/>: CPT

## 2025-04-28 PROCEDURE — 0DJDXZZ INSPECTION OF LOWER INTESTINAL TRACT, EXTERNAL APPROACH: ICD-10-PCS | Performed by: RADIOLOGY

## 2025-04-28 RX ORDER — GABAPENTIN 100 MG/1
100 CAPSULE ORAL ONCE
Status: COMPLETED | OUTPATIENT
Start: 2025-04-28 | End: 2025-04-28

## 2025-04-28 RX ORDER — SODIUM CHLORIDE, SODIUM LACTATE, POTASSIUM CHLORIDE, CALCIUM CHLORIDE 600; 310; 30; 20 MG/100ML; MG/100ML; MG/100ML; MG/100ML
INJECTION, SOLUTION INTRAVENOUS CONTINUOUS PRN
Status: DISCONTINUED | OUTPATIENT
Start: 2025-04-28 | End: 2025-04-28

## 2025-04-28 RX ORDER — LIDOCAINE HYDROCHLORIDE 20 MG/ML
INJECTION, SOLUTION EPIDURAL; INFILTRATION; INTRACAUDAL; PERINEURAL AS NEEDED
Status: DISCONTINUED | OUTPATIENT
Start: 2025-04-28 | End: 2025-04-28

## 2025-04-28 RX ORDER — HYDRALAZINE HYDROCHLORIDE 20 MG/ML
10 INJECTION INTRAMUSCULAR; INTRAVENOUS EVERY 4 HOURS PRN
Status: DISCONTINUED | OUTPATIENT
Start: 2025-04-28 | End: 2025-04-30 | Stop reason: HOSPADM

## 2025-04-28 RX ORDER — PROPOFOL 10 MG/ML
INJECTION, EMULSION INTRAVENOUS AS NEEDED
Status: DISCONTINUED | OUTPATIENT
Start: 2025-04-28 | End: 2025-04-28

## 2025-04-28 RX ADMIN — HEPARIN SODIUM 5000 UNITS: 5000 INJECTION INTRAVENOUS; SUBCUTANEOUS at 05:08

## 2025-04-28 RX ADMIN — SODIUM CHLORIDE, SODIUM LACTATE, POTASSIUM CHLORIDE, AND CALCIUM CHLORIDE: .6; .31; .03; .02 INJECTION, SOLUTION INTRAVENOUS at 11:30

## 2025-04-28 RX ADMIN — SENNOSIDES AND DOCUSATE SODIUM 2 TABLET: 50; 8.6 TABLET ORAL at 09:15

## 2025-04-28 RX ADMIN — ATORVASTATIN CALCIUM 40 MG: 40 TABLET, FILM COATED ORAL at 17:10

## 2025-04-28 RX ADMIN — PROPOFOL 30 MG: 10 INJECTION, EMULSION INTRAVENOUS at 11:35

## 2025-04-28 RX ADMIN — OXYCODONE HYDROCHLORIDE 20 MG: 10 TABLET ORAL at 02:23

## 2025-04-28 RX ADMIN — HYDRALAZINE HYDROCHLORIDE 5 MG: 20 INJECTION INTRAMUSCULAR; INTRAVENOUS at 01:20

## 2025-04-28 RX ADMIN — GABAPENTIN 100 MG: 100 CAPSULE ORAL at 03:28

## 2025-04-28 RX ADMIN — SODIUM CHLORIDE, SODIUM LACTATE, POTASSIUM CHLORIDE, AND CALCIUM CHLORIDE 75 ML/HR: .6; .31; .03; .02 INJECTION, SOLUTION INTRAVENOUS at 22:00

## 2025-04-28 RX ADMIN — GABAPENTIN 300 MG: 300 CAPSULE ORAL at 21:44

## 2025-04-28 RX ADMIN — BICTEGRAVIR SODIUM, EMTRICITABINE, AND TENOFOVIR ALAFENAMIDE FUMARATE 1 TABLET: 50; 200; 25 TABLET ORAL at 09:15

## 2025-04-28 RX ADMIN — OXYCODONE HYDROCHLORIDE 20 MG: 10 TABLET ORAL at 08:02

## 2025-04-28 RX ADMIN — ACETAMINOPHEN 650 MG: 325 TABLET, FILM COATED ORAL at 19:57

## 2025-04-28 RX ADMIN — ACETAMINOPHEN 650 MG: 325 TABLET, FILM COATED ORAL at 02:23

## 2025-04-28 RX ADMIN — LIDOCAINE HYDROCHLORIDE 100 MG: 20 INJECTION, SOLUTION EPIDURAL; INFILTRATION; INTRACAUDAL; PERINEURAL at 11:33

## 2025-04-28 RX ADMIN — PROPOFOL 100 MG: 10 INJECTION, EMULSION INTRAVENOUS at 11:33

## 2025-04-28 RX ADMIN — METOPROLOL SUCCINATE 50 MG: 50 TABLET, EXTENDED RELEASE ORAL at 09:15

## 2025-04-28 RX ADMIN — HYDRALAZINE HYDROCHLORIDE 10 MG: 20 INJECTION INTRAMUSCULAR; INTRAVENOUS at 13:50

## 2025-04-28 RX ADMIN — ZOLPIDEM TARTRATE 10 MG: 5 TABLET, FILM COATED ORAL at 23:44

## 2025-04-28 RX ADMIN — ACETAMINOPHEN 650 MG: 325 TABLET, FILM COATED ORAL at 12:51

## 2025-04-28 RX ADMIN — FOLIC ACID 1000 MCG: 1 TABLET ORAL at 09:15

## 2025-04-28 RX ADMIN — OXYCODONE HYDROCHLORIDE 20 MG: 10 TABLET ORAL at 19:57

## 2025-04-28 RX ADMIN — HEPARIN SODIUM 5000 UNITS: 5000 INJECTION INTRAVENOUS; SUBCUTANEOUS at 21:44

## 2025-04-28 RX ADMIN — OXYCODONE HYDROCHLORIDE 20 MG: 10 TABLET ORAL at 12:51

## 2025-04-28 RX ADMIN — PROPOFOL 30 MG: 10 INJECTION, EMULSION INTRAVENOUS at 11:38

## 2025-04-28 RX ADMIN — HEPARIN SODIUM 5000 UNITS: 5000 INJECTION INTRAVENOUS; SUBCUTANEOUS at 17:10

## 2025-04-28 RX ADMIN — HYDRALAZINE HYDROCHLORIDE 5 MG: 20 INJECTION INTRAMUSCULAR; INTRAVENOUS at 10:31

## 2025-04-28 NOTE — PROGRESS NOTES
"Progress Note - Hospitalist   Name: Caesar Hurd 70 y.o. male I MRN: [id number]  Unit/Bed#: -01 I Date of Admission: 4/26/2025   Date of Service: 4/28/2025 I Hospital Day: 2    Assessment & Plan  Constipation  Presents with 10 days of constipation, has been battling for months and was found to have a perirectal mass in January of this year.   Chronic constipation at this point, complicated by rectal mass  Aggressive bowel regimen, monitor stool output   Flexible sigmoidoscopy (4/28/25): \"Constipation likely secondary to extrinsic compression from pelvic mass. No mucosal lesion was seen on both colonoscopy or this flexible sigmoidoscopy.\"  IR consult for biopsy of pelvic mass  Rectal mass  Identified earlier this year due to patient reporting sensation of something \"blocking him\" causing constipation. He underwent colonoscopy with biopsy 3/21/2025 through [facility] colorectal service. Biopsy was NOT consistent with colorectal cancer at that time.    CT abdomen/pelvis (4/26/25): There is a large rectal mass, with direct invasion of the mesorectal fascia, left seminal vesicle, left bladder base, left pelvic sidewall. There are multiple left iliac and multiple hepatic lesions, highly suspicious for metastatic disease. The prior outside comparison studies are not available for direct comparison at this time. There are also multiple sclerotic osseous lesions, consistent with metastatic disease. This may be separately related to the patient's prostate cancer.  GI consulted,  Flex sig performed 4/28 with random forceps biopsies  Recommend IR biopsy of pelvic mass  Essential hypertension  Chronic, on metoprolol which we will continue  Monitor VS per unit protocol   HIV infection (HCC)  History of, on Biktarvy and endorses compliance  Will continue HAART  Stage 3a chronic kidney disease (HCC)  Cr baseline 1.2-1.3, presenting Cr 1.00  Will monitor   Cancer of multiple primary sites (HCC)  History of lymphoma, renal cell " carcinoma, prostate adenocarcinoma; now with rectal mass concerning for cancer  Followed by [facility] oncology teams, however is requesting second opinion     VTE Pharmacologic Prophylaxis: VTE Score: 5 High Risk (Score >/= 5) - Pharmacological DVT Prophylaxis Ordered: heparin. Sequential Compression Devices Ordered.    Mobility:   Basic Mobility Inpatient Raw Score: 24  JH-HLM Goal: 8: Walk 250 feet or more  JH-HLM Achieved: 7: Walk 25 feet or more  JH-HLM Goal NOT achieved. Continue with multidisciplinary rounding and encourage appropriate mobility to improve upon JH-HLM goals.    Patient Centered Rounds: I performed bedside rounds with nursing staff today.   Discussions with Specialists or Other Care Team Provider: GI    Education and Discussions with Family / Patient: Patient declined call to . Family is not aware of diagnoses.     Current Length of Stay: 2 day(s)  Current Patient Status: Inpatient   Certification Statement: The patient will continue to require additional inpatient hospital stay due to inpatient IR biopsy  Discharge Plan: Anticipate discharge in 24-48 hrs to home.    Code Status: Level 1 - Full Code    Subjective   Patient shares he feels well. Denies any pain, nausea, or vomiting. Briefly discussed flex sig results and IR consult for biopsy. Patient is agreeable to plan.     Objective :  Temp:  [97.8 °F (36.6 °C)-98.4 °F (36.9 °C)] 97.9 °F (36.6 °C)  HR:  [55-74] 59  BP: (111-220)/(52-95) 217/83  Resp:  [13-21] 21  SpO2:  [95 %-99 %] 96 %  O2 Device: None (Room air)    Body mass index is 26.47 kg/m².     Input and Output Summary (last 24 hours):     Intake/Output Summary (Last 24 hours) at 4/28/2025 1420  Last data filed at 4/28/2025 1141  Gross per 24 hour   Intake 380 ml   Output --   Net 380 ml       Physical Exam  Constitutional:       General: He is not in acute distress.  HENT:      Head: Normocephalic and atraumatic.   Eyes:      General: No scleral icterus.      Conjunctiva/sclera: Conjunctivae normal.   Cardiovascular:      Rate and Rhythm: Normal rate and regular rhythm.      Pulses: Normal pulses.      Heart sounds: Normal heart sounds. No murmur heard.  Pulmonary:      Effort: Pulmonary effort is normal. No respiratory distress.      Breath sounds: Normal breath sounds. No wheezing.   Abdominal:      General: Bowel sounds are normal.   Musculoskeletal:      Right lower leg: No edema.      Left lower leg: No edema.   Skin:     General: Skin is warm and dry.   Neurological:      Mental Status: He is alert and oriented to person, place, and time.   Psychiatric:         Mood and Affect: Mood normal.         Behavior: Behavior normal.         Thought Content: Thought content normal.         Judgment: Judgment normal.         Lines/Drains:          Lab Results: I have reviewed the following results:   Results from last 7 days   Lab Units 04/27/25  0515 04/26/25  1234   WBC Thousand/uL 6.46 7.00   HEMOGLOBIN g/dL 10.5* 11.4*   HEMATOCRIT % 32.1* 34.8*   PLATELETS Thousands/uL 228 211   SEGS PCT %  --  54   LYMPHO PCT %  --  34   MONO PCT %  --  11   EOS PCT %  --  1     Results from last 7 days   Lab Units 04/27/25  0515 04/26/25  1234   SODIUM mmol/L 139 139   POTASSIUM mmol/L 3.9 4.6   CHLORIDE mmol/L 103 105   CO2 mmol/L 30 30   BUN mg/dL 13 17   CREATININE mg/dL 0.91 1.00   ANION GAP mmol/L 6 4   CALCIUM mg/dL 9.3 9.6   ALBUMIN g/dL  --  4.2   TOTAL BILIRUBIN mg/dL  --  0.98   ALK PHOS U/L  --  670*   ALT U/L  --  17   AST U/L  --  34   GLUCOSE RANDOM mg/dL 149* 115                       Recent Cultures (last 7 days):         Imaging Results Review: I reviewed radiology reports from this admission including: CT abdomen/pelvis.  Other Study Results Review: No additional pertinent studies reviewed.    Last 24 Hours Medication List:     Current Facility-Administered Medications:     [Held by provider] Abiraterone Acetate TABS 1,000 mg, Daily    acetaminophen (TYLENOL) tablet  650 mg, Q6H PRN    aluminum-magnesium hydroxide-simethicone (MAALOX) oral suspension 30 mL, Q6H PRN    atorvastatin (LIPITOR) tablet 40 mg, Daily With Dinner    bictegravir-emtricitab-tenofovir alafenamide (BIKTARVY) -25 MG tablet 1 tablet, Daily With Breakfast    folic acid (FOLVITE) tablet 1,000 mcg, Daily    gabapentin (NEURONTIN) capsule 300 mg, HS    heparin (porcine) subcutaneous injection 5,000 Units, Q8H MELINDA    hydrALAZINE (APRESOLINE) injection 10 mg, Q4H PRN    lactated ringers infusion, Continuous, Last Rate: 75 mL/hr (04/27/25 1931)    magnesium citrate (CITROMA) oral solution 296 mL, Once    metoprolol succinate (TOPROL-XL) 24 hr tablet 50 mg, Daily    mineral oil enema 1 enema, Once    ondansetron (ZOFRAN) injection 4 mg, Q6H PRN    oxyCODONE (ROXICODONE) immediate release tablet 20 mg, Q4H PRN    senna-docusate sodium (SENOKOT S) 8.6-50 mg per tablet 2 tablet, BID    zolpidem (AMBIEN) tablet 10 mg, Daily PRN    Administrative Statements   Today, Patient Was Seen By: Rossana Hughes PA-C  I have spent a total time of 40 minutes in caring for this patient on the day of the visit/encounter including Diagnostic results, Risks and benefits of tx options, Instructions for management, Patient and family education, Counseling / Coordination of care, Documenting in the medical record, Reviewing/placing orders in the medical record (including tests, medications, and/or procedures), and Communicating with other healthcare professionals .    **Please Note: This note may have been constructed using a voice recognition system.**

## 2025-04-28 NOTE — ANESTHESIA POSTPROCEDURE EVALUATION
Post-Op Assessment Note    CV Status:  Stable  Pain Score: 0    Pain management: adequate       Mental Status:  Awake and sleepy   Hydration Status:  Euvolemic   PONV Controlled:  Controlled   Airway Patency:  Patent     Post Op Vitals Reviewed: Yes    No anethesia notable event occurred.    Staff: CRNA           Last Filed PACU Vitals:  Vitals Value Taken Time   Temp 97.5    Pulse 70    /64    Resp 14    SpO2 97

## 2025-04-28 NOTE — ASSESSMENT & PLAN NOTE
History of lymphoma, renal cell carcinoma, prostate adenocarcinoma; now with rectal mass concerning for cancer  [facility], however is requesting second opinion

## 2025-04-28 NOTE — TELEMEDICINE
e-Consult (IPC)  - Interventional Radiology  Caesar Hurd 70 y.o. male MRN: [id number]  Unit/Bed#: -01 Encounter: [id number]          Interventional Radiology has been consulted to evaluate Caesar Hurd    We were consulted by Gastroenterology concerning this patient with metastatic disease.    Inpatient Consult to IR  Consult performed by: ALBA Clark  Consult ordered by: Rossana Hughes PA-C        04/28/25    Assessment/Recommendation:   Caesar Hurd, 70y male with prostate cancer, renal cancer, left nephrectomy, rectal mass, HIV, CKD stage 3A and multiple metastatic lesions including bone, liver and lymph nodes.    Patient presented to the ER on 4/26 with reports of constipation x ten days.    Patient had a CT A/P that showed:  large rectal mass, with direct invasion of the mesorectal fascia, left seminal vesicle, left bladder base, left pelvic sidewall. There are multiple left iliac and multiple hepatic lesions, highly suspicious for metastatic disease.   There are also multiple sclerotic osseous lesions, consistent with metastatic disease.   This may be separately related to the patient's prostate cancer.         CT images reviewed with Dr. Diaz.  Liver mass is and left iliac are both amenable for biopsy.    Plan discussed with Dr. Trey Hansen iin regards to liver mass vs bone biopsy. Dr. Hansen deferred to IR for best site.    Will plan for liver mass biopsy.    Plan -  Liver mass biopsy tentatively Wednesday - timing to be determined by team availability.  NPO after midnight prior to procedure.  HOLD all AC/AP medications on Wednesday.        31 + minutes, >50% of the total time devoted to medical consultative verbal/EMR discussion between providers. Written report will be generated in the EMR.     Thank you for allowing Interventional Radiology to participate in the care of Caesar Hudr.     Please don't hesitate to call or Secure Chat us with any questions.     Kiersten Damian  CRNP

## 2025-04-28 NOTE — ANESTHESIA PREPROCEDURE EVALUATION
Procedure:  FLEXIBLE SIGMOIDOSCOPY    71 yo M with PMHx of HTN, HIV on Biktarvy, renal cell carcinoma (s/p L nephrectomy 2000), lymphoma (s/p chemo 2008), prostate ca (s/p RT 2019, androgen deprivation therapy) admitted with progressive constipation and large rectal mass with invasion of surrounding tissues.    Last time during colonoscopy felt dizzy and off balance afterwards. Tolerated anesthesia here before. METS>4, NPO appropriate.    Relevant Problems   CARDIO   (+) Essential hypertension      GI/HEPATIC   (+) Lesion of liver      /RENAL   (+) Acquired absence of kidney   (+) Stage 3a chronic kidney disease (HCC)      Oncology   (+) Cancer of multiple primary sites (HCC)      Other   (+) HIV infection (HCC)      Lab Results   Component Value Date/Time    WBC 6.46 04/27/2025 05:15 AM    RBC 3.51 (L) 04/27/2025 05:15 AM    HGB 10.5 (L) 04/27/2025 05:15 AM    HCT 32.1 (L) 04/27/2025 05:15 AM     04/27/2025 05:15 AM     Lab Results   Component Value Date/Time    SODIUM 139 04/27/2025 05:15 AM    K 3.9 04/27/2025 05:15 AM     04/27/2025 05:15 AM    CO2 30 04/27/2025 05:15 AM    BUN 13 04/27/2025 05:15 AM    CREATININE 0.91 04/27/2025 05:15 AM    GLUC 149 (H) 04/27/2025 05:15 AM     Lab Results   Component Value Date/Time    ALKPHOS 670 (H) 04/26/2025 12:34 PM    ALT 17 04/26/2025 12:34 PM    AST 34 04/26/2025 12:34 PM    ALB 4.2 04/26/2025 12:34 PM    TBILI 0.98 04/26/2025 12:34 PM    LIPASE 16 04/26/2025 12:34 PM     CT abdomen pelvis with contrast  Result Date: 4/26/2025  Impression: 1.  There is a large rectal mass, with direct invasion of the mesorectal fascia, left seminal vesicle, left bladder base, left pelvic sidewall. There are multiple left iliac and multiple hepatic lesions, highly suspicious for metastatic disease. The prior outside comparison studies are not available for direct comparison at this time. 2.  There are also multiple sclerotic osseous lesions, consistent with metastatic  disease. This may be separately related to the patient's prostate cancer      Physical Exam    Airway    Mallampati score: III  TM Distance: >3 FB  Neck ROM: full     Dental        Cardiovascular  Cardiovascular exam normal    Pulmonary  Pulmonary exam normal     Other Findings        Anesthesia Plan  ASA Score- 3     Anesthesia Type- IV sedation with anesthesia with ASA Monitors.         Additional Monitors:     Airway Plan:            Plan Factors-Exercise tolerance (METS): >4 METS.    Chart reviewed. EKG reviewed. Imaging results reviewed. Existing labs reviewed. Patient summary reviewed.    Patient is not a current smoker.      Obstructive sleep apnea risk education given perioperatively.        Induction- intravenous.    Postoperative Plan-     Perioperative Resuscitation Plan - Level 1 - Full Code.       Informed Consent- Anesthetic plan and risks discussed with patient.  I personally reviewed this patient with the CRNA. Discussed and agreed on the Anesthesia Plan with the CRNA..      NPO Status:  No vitals data found for the desired time range.

## 2025-04-28 NOTE — PLAN OF CARE
Problem: INFECTION - ADULT  Goal: Absence or prevention of progression during hospitalization  Description: INTERVENTIONS:- Assess and monitor for signs and symptoms of infection- Monitor lab/diagnostic results- Monitor all insertion sites, i.e. indwelling lines, tubes, and drains- Monitor endotracheal if appropriate and nasal secretions for changes in amount and color- Elba appropriate cooling/warming therapies per order- Administer medications as ordered- Instruct and encourage patient and family to use good hand hygiene technique- Identify and instruct in appropriate isolation precautions for identified infection/condition  Outcome: Progressing     Problem: PAIN - ADULT  Goal: Verbalizes/displays adequate comfort level or baseline comfort level  Description: Interventions:- Encourage patient to monitor pain and request assistance- Assess pain using appropriate pain scale- Administer analgesics based on type and severity of pain and evaluate response- Implement non-pharmacological measures as appropriate and evaluate response- Consider cultural and social influences on pain and pain management- Notify physician/advanced practitioner if interventions unsuccessful or patient reports new pain  Outcome: Progressing

## 2025-04-28 NOTE — PLAN OF CARE
Problem: PAIN - ADULT  Goal: Verbalizes/displays adequate comfort level or baseline comfort level  Description: Interventions:- Encourage patient to monitor pain and request assistance- Assess pain using appropriate pain scale- Administer analgesics based on type and severity of pain and evaluate response- Implement non-pharmacological measures as appropriate and evaluate response- Consider cultural and social influences on pain and pain management- Notify physician/advanced practitioner if interventions unsuccessful or patient reports new pain  Outcome: Progressing     Problem: INFECTION - ADULT  Goal: Absence or prevention of progression during hospitalization  Description: INTERVENTIONS:- Assess and monitor for signs and symptoms of infection- Monitor lab/diagnostic results- Monitor all insertion sites, i.e. indwelling lines, tubes, and drains- Monitor endotracheal if appropriate and nasal secretions for changes in amount and color- [place] appropriate cooling/warming therapies per order- Administer medications as ordered- Instruct and encourage patient and family to use good hand hygiene technique- Identify and instruct in appropriate isolation precautions for identified infection/condition  Outcome: Progressing  Goal: Absence of fever/infection during neutropenic period  Description: INTERVENTIONS:- Monitor WBC  Outcome: Progressing     Problem: SAFETY ADULT  Goal: Patient will remain free of falls  Description: INTERVENTIONS:- Educate patient/family on patient safety including physical limitations- Instruct patient to call for assistance with activity - Consult OT/PT to assist with strengthening/mobility - Keep Call bell within reach- Keep bed low and locked with side rails adjusted as appropriate- Keep care items and personal belongings within reach- Initiate and maintain comfort rounds- Make Fall Risk Sign visible to staff- Offer Toileting every 2 Hours, in advance of need- Initiate/Maintain alarm-  Obtain necessary fall risk management equipment: - Apply yellow socks and bracelet for high fall risk patients- Consider moving patient to room near nurses station  Outcome: Progressing  Goal: Maintain or return to baseline ADL function  Description: INTERVENTIONS:-  Assess patient's ability to carry out ADLs; assess patient's baseline for ADL function and identify physical deficits which impact ability to perform ADLs (bathing, care of mouth/teeth, toileting, grooming, dressing, etc.)- Assess/evaluate cause of self-care deficits - Assess range of motion- Assess patient's mobility; develop plan if impaired- Assess patient's need for assistive devices and provide as appropriate- Encourage maximum independence but intervene and supervise when necessary- Involve family in performance of ADLs- Assess for home care needs following discharge - Consider OT consult to assist with ADL evaluation and planning for discharge- Provide patient education as appropriate  Outcome: Progressing  Goal: Maintains/Returns to pre admission functional level  Description: INTERVENTIONS:- Perform AM-PAC 6 Click Basic Mobility/ Daily Activity assessment daily.- Set and communicate daily mobility goal to care team and patient/family/caregiver. - Collaborate with rehabilitation services on mobility goals if consulted- Perform Range of Motion 2 times a day.- Reposition patient every 2 hours.- Dangle patient 2 times a day- Stand patient 2 times a day- Ambulate patient 2 times a day- Out of bed to chair 2 times a day - Out of bed for meals 2 times a day- Out of bed for toileting- Record patient progress and toleration of activity level   Outcome: Progressing     Problem: DISCHARGE PLANNING  Goal: Discharge to home or other facility with appropriate resources  Description: INTERVENTIONS:- Identify barriers to discharge w/patient and caregiver- Arrange for needed discharge resources and transportation as appropriate- Identify discharge learning  needs (meds, wound care, etc.)- Arrange for interpretive services to assist at discharge as needed- Refer to Case Management Department for coordinating discharge planning if the patient needs post-hospital services based on physician/advanced practitioner order or complex needs related to functional status, cognitive ability, or social support system  Outcome: Progressing     Problem: Knowledge Deficit  Goal: Patient/family/caregiver demonstrates understanding of disease process, treatment plan, medications, and discharge instructions  Description: Complete learning assessment and assess knowledge base.Interventions:- Provide teaching at level of understanding- Provide teaching via preferred learning methods  Outcome: Progressing

## 2025-04-28 NOTE — ASSESSMENT & PLAN NOTE
"Identified earlier this year due to patient reporting sensation of something \"blocking him\" causing constipation. He underwent colonoscopy with biopsy 3/21/2025 through [facility] colorectal service. Biopsy was NOT consistent with colorectal cancer at that time.    CT abdomen/pelvis (4/26/25): There is a large rectal mass, with direct invasion of the mesorectal fascia, left seminal vesicle, left bladder base, left pelvic sidewall. There are multiple left iliac and multiple hepatic lesions, highly suspicious for metastatic disease. The prior outside comparison studies are not available for direct comparison at this time. There are also multiple sclerotic osseous lesions, consistent with metastatic disease. This may be separately related to the patient's prostate cancer.  GI consulted,  Flex sig performed 4/28 with random forceps biopsies  Recommend IR biopsy of pelvic mass  "

## 2025-04-28 NOTE — ASSESSMENT & PLAN NOTE
"Presents with 10 days of constipation, has been battling for months and was found to have a perirectal mass in January of this year.   Chronic constipation at this point, complicated by rectal mass  Aggressive bowel regimen, monitor stool output   Flexible sigmoidoscopy (4/28/25): \"Constipation likely secondary to extrinsic compression from pelvic mass. No mucosal lesion was seen on both colonoscopy or this flexible sigmoidoscopy.\"  IR consult for biopsy of pelvic mass  "

## 2025-04-29 ENCOUNTER — APPOINTMENT (INPATIENT)
Dept: CT IMAGING | Facility: HOSPITAL | Age: 70
DRG: 392 | End: 2025-04-29
Payer: COMMERCIAL

## 2025-04-29 LAB
CEA SERPL-MCNC: 27.1 NG/ML (ref 0–3)
INR PPP: 0.97 (ref 0.85–1.19)
PROTHROMBIN TIME: 13.6 SECONDS (ref 12.3–15)
PSA FREE MFR SERPL: 17.05 %
PSA FREE SERPL-MCNC: 32.16 NG/ML
PSA SERPL-MCNC: 188.64 NG/ML (ref 0–4)

## 2025-04-29 PROCEDURE — 84154 ASSAY OF PSA FREE: CPT | Performed by: PHYSICIAN ASSISTANT

## 2025-04-29 PROCEDURE — 71260 CT THORAX DX C+: CPT

## 2025-04-29 PROCEDURE — 99222 1ST HOSP IP/OBS MODERATE 55: CPT | Performed by: STUDENT IN AN ORGANIZED HEALTH CARE EDUCATION/TRAINING PROGRAM

## 2025-04-29 PROCEDURE — 85610 PROTHROMBIN TIME: CPT

## 2025-04-29 PROCEDURE — 99232 SBSQ HOSP IP/OBS MODERATE 35: CPT | Performed by: PHYSICIAN ASSISTANT

## 2025-04-29 PROCEDURE — 84153 ASSAY OF PSA TOTAL: CPT | Performed by: PHYSICIAN ASSISTANT

## 2025-04-29 PROCEDURE — 82378 CARCINOEMBRYONIC ANTIGEN: CPT | Performed by: PHYSICIAN ASSISTANT

## 2025-04-29 RX ORDER — HYDROXYZINE HYDROCHLORIDE 10 MG/1
10 TABLET, FILM COATED ORAL ONCE
Status: DISCONTINUED | OUTPATIENT
Start: 2025-04-29 | End: 2025-04-30 | Stop reason: HOSPADM

## 2025-04-29 RX ADMIN — OXYCODONE HYDROCHLORIDE 20 MG: 10 TABLET ORAL at 12:01

## 2025-04-29 RX ADMIN — HEPARIN SODIUM 5000 UNITS: 5000 INJECTION INTRAVENOUS; SUBCUTANEOUS at 05:42

## 2025-04-29 RX ADMIN — OXYCODONE HYDROCHLORIDE 20 MG: 10 TABLET ORAL at 19:42

## 2025-04-29 RX ADMIN — HEPARIN SODIUM 5000 UNITS: 5000 INJECTION INTRAVENOUS; SUBCUTANEOUS at 13:44

## 2025-04-29 RX ADMIN — IOHEXOL 100 ML: 350 INJECTION, SOLUTION INTRAVENOUS at 16:43

## 2025-04-29 RX ADMIN — ATORVASTATIN CALCIUM 40 MG: 40 TABLET, FILM COATED ORAL at 17:38

## 2025-04-29 RX ADMIN — METOPROLOL SUCCINATE 50 MG: 50 TABLET, EXTENDED RELEASE ORAL at 10:03

## 2025-04-29 RX ADMIN — SENNOSIDES AND DOCUSATE SODIUM 2 TABLET: 50; 8.6 TABLET ORAL at 10:00

## 2025-04-29 RX ADMIN — SENNOSIDES AND DOCUSATE SODIUM 2 TABLET: 50; 8.6 TABLET ORAL at 17:38

## 2025-04-29 RX ADMIN — HYDRALAZINE HYDROCHLORIDE 10 MG: 20 INJECTION INTRAMUSCULAR; INTRAVENOUS at 01:08

## 2025-04-29 RX ADMIN — FOLIC ACID 1000 MCG: 1 TABLET ORAL at 10:00

## 2025-04-29 RX ADMIN — HEPARIN SODIUM 5000 UNITS: 5000 INJECTION INTRAVENOUS; SUBCUTANEOUS at 21:56

## 2025-04-29 RX ADMIN — BICTEGRAVIR SODIUM, EMTRICITABINE, AND TENOFOVIR ALAFENAMIDE FUMARATE 1 TABLET: 50; 200; 25 TABLET ORAL at 10:00

## 2025-04-29 RX ADMIN — HYDRALAZINE HYDROCHLORIDE 10 MG: 20 INJECTION INTRAMUSCULAR; INTRAVENOUS at 19:44

## 2025-04-29 RX ADMIN — GABAPENTIN 300 MG: 300 CAPSULE ORAL at 21:56

## 2025-04-29 NOTE — PLAN OF CARE
Problem: PAIN - ADULT  Goal: Verbalizes/displays adequate comfort level or baseline comfort level  Description: Interventions:- Encourage patient to monitor pain and request assistance- Assess pain using appropriate pain scale- Administer analgesics based on type and severity of pain and evaluate response- Implement non-pharmacological measures as appropriate and evaluate response- Consider cultural and social influences on pain and pain management- Notify physician/advanced practitioner if interventions unsuccessful or patient reports new pain  Outcome: Progressing     Problem: INFECTION - ADULT  Goal: Absence or prevention of progression during hospitalization  Description: INTERVENTIONS:- Assess and monitor for signs and symptoms of infection- Monitor lab/diagnostic results- Monitor all insertion sites, i.e. indwelling lines, tubes, and drains- Monitor endotracheal if appropriate and nasal secretions for changes in amount and color- [place] appropriate cooling/warming therapies per order- Administer medications as ordered- Instruct and encourage patient and family to use good hand hygiene technique- Identify and instruct in appropriate isolation precautions for identified infection/condition  Outcome: Progressing  Goal: Absence of fever/infection during neutropenic period  Description: INTERVENTIONS:- Monitor WBC  Outcome: Progressing     Problem: SAFETY ADULT  Goal: Patient will remain free of falls  Description: INTERVENTIONS:- Educate patient/family on patient safety including physical limitations- Instruct patient to call for assistance with activity - Consult OT/PT to assist with strengthening/mobility - Keep Call bell within reach- Keep bed low and locked with side rails adjusted as appropriate- Keep care items and personal belongings within reach- Initiate and maintain comfort rounds- Make Fall Risk Sign visible to staff- Offer Toileting every 2 Hours, in advance of need- Initiate/Maintain alarm-  Obtain necessary fall risk management equipment: - Apply yellow socks and bracelet for high fall risk patients- Consider moving patient to room near nurses station  Outcome: Progressing  Goal: Maintain or return to baseline ADL function  Description: INTERVENTIONS:-  Assess patient's ability to carry out ADLs; assess patient's baseline for ADL function and identify physical deficits which impact ability to perform ADLs (bathing, care of mouth/teeth, toileting, grooming, dressing, etc.)- Assess/evaluate cause of self-care deficits - Assess range of motion- Assess patient's mobility; develop plan if impaired- Assess patient's need for assistive devices and provide as appropriate- Encourage maximum independence but intervene and supervise when necessary- Involve family in performance of ADLs- Assess for home care needs following discharge - Consider OT consult to assist with ADL evaluation and planning for discharge- Provide patient education as appropriate  Outcome: Progressing  Goal: Maintains/Returns to pre admission functional level  Description: INTERVENTIONS:- Perform AM-PAC 6 Click Basic Mobility/ Daily Activity assessment daily.- Set and communicate daily mobility goal to care team and patient/family/caregiver. - Collaborate with rehabilitation services on mobility goals if consulted- Perform Range of Motion 3 times a day.- Reposition patient every 2 hours.- Dangle patient 3 times a day- Stand patient 3 times a day- Ambulate patient 3 times a day- Out of bed to chair 3 times a day - Out of bed for meals 3 times a day- Out of bed for toileting- Record patient progress and toleration of activity level   Outcome: Progressing     Problem: DISCHARGE PLANNING  Goal: Discharge to home or other facility with appropriate resources  Description: INTERVENTIONS:- Identify barriers to discharge w/patient and caregiver- Arrange for needed discharge resources and transportation as appropriate- Identify discharge learning  needs (meds, wound care, etc.)- Arrange for interpretive services to assist at discharge as needed- Refer to Case Management Department for coordinating discharge planning if the patient needs post-hospital services based on physician/advanced practitioner order or complex needs related to functional status, cognitive ability, or social support system  Outcome: Progressing     Problem: Knowledge Deficit  Goal: Patient/family/caregiver demonstrates understanding of disease process, treatment plan, medications, and discharge instructions  Description: Complete learning assessment and assess knowledge base.Interventions:- Provide teaching at level of understanding- Provide teaching via preferred learning methods  Outcome: Progressing

## 2025-04-29 NOTE — ASSESSMENT & PLAN NOTE
Pt with a history of multiple cancers including recurrent prostate cancer now presenting with constipation and pelvic pain in the context of a large left sided perirectal mass and pelvic LAD. 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 We reviewed the role of palliative RT in his case, noting that given his prior history of prostate/pelvic RT additional RT would come with increased risk of complications. At this time we will await biopsy and discussion of systemic therapy options before planning on any RT. I additionally recommend MRI Lumbar spine to rule out nerve involvement given exam findings and LE weakness. Given his imaging findings and out limitations with regard to RT I expect systemic therapy may offer a better likelihood of symptom control at this juncture. I will remain available to re-evaluate the patient as needed pending biopsy results.     Summary:  - Follow-up biopsy results.  - Recommend MRI Lumbar spine  - No RT at present  - Follow-up medical oncology recommendations after pathologic diagnosis established.

## 2025-04-29 NOTE — ASSESSMENT & PLAN NOTE
History of lymphoma, renal cell carcinoma, prostate adenocarcinoma; now with rectal mass concerning for cancer  Followed by [facility] oncology teams, however is requesting second opinion

## 2025-04-29 NOTE — PROGRESS NOTES
"Progress Note - Hospitalist   Name: Caesar Hurd 70 y.o. male I MRN: [id number]  Unit/Bed#: -01 I Date of Admission: 4/26/2025   Date of Service: 4/29/2025 I Hospital Day: 3    Assessment & Plan  Constipation  Presents with 10 days of constipation, has been battling for months and was found to have a perirectal mass in January of this year.   Chronic constipation at this point, complicated by rectal mass  Bowel regimen, monitor stool output   Flexible sigmoidoscopy (4/28/25): \"Constipation likely secondary to extrinsic compression from pelvic mass. No mucosal lesion was seen on both colonoscopy or this flexible sigmoidoscopy.\"  Per IR, liver biopsy tomorrow  Rectal mass  Identified earlier this year due to patient reporting sensation of something \"blocking him\" causing constipation. He underwent colonoscopy with biopsy 3/21/2025 through [facility] colorectal service. Biopsy was NOT consistent with colorectal cancer at that time.    CT abdomen/pelvis (4/26/25): There is a large rectal mass, with direct invasion of the mesorectal fascia, left seminal vesicle, left bladder base, left pelvic sidewall. There are multiple left iliac and multiple hepatic lesions, highly suspicious for metastatic disease. The prior outside comparison studies are not available for direct comparison at this time. There are also multiple sclerotic osseous lesions, consistent with metastatic disease. This may be separately related to the patient's prostate cancer.  GI consulted,  Flex sig performed 4/28 with random forceps biopsies  IR liver biopsy tomorrow   Essential hypertension  Chronic, on metoprolol which we will continue  Monitor VS per unit protocol   HIV infection (HCC)  History of, on Biktarvy and endorses compliance  Will continue HAART  Stage 3a chronic kidney disease (HCC)  Cr baseline 1.2-1.3, presenting Cr 1.00  Will monitor   Cancer of multiple primary sites (HCC)  History of lymphoma, renal cell carcinoma, prostate " adenocarcinoma; now with rectal mass concerning for cancer  Followed by [facility] oncology teams, however is requesting second opinion     VTE Pharmacologic Prophylaxis: VTE Score: 5 High Risk (Score >/= 5) - Pharmacological DVT Prophylaxis Ordered: heparin. Sequential Compression Devices Ordered.    Mobility:   Basic Mobility Inpatient Raw Score: 24  JH-HLM Goal: 8: Walk 250 feet or more  JH-HLM Achieved: 7: Walk 25 feet or more  JH-HLM Goal NOT achieved. Continue with multidisciplinary rounding and encourage appropriate mobility to improve upon JH-HLM goals.    Patient Centered Rounds: I performed bedside rounds with nursing staff today.   Discussions with Specialists or Other Care Team Provider: IR, oncology    Education and Discussions with Family / Patient: Patient declined call to . Family not aware of diagnoses.     Current Length of Stay: 3 day(s)  Current Patient Status: Inpatient   Certification Statement: The patient will continue to require additional inpatient hospital stay due to inpatient IR biopsy tomorrow.   Discharge Plan: Anticipate discharge tomorrow to home.    Code Status: Level 1 - Full Code    Subjective   Patient was seen this morning laying comfortably in bed. Reports slight headache and leg pain. Patient continues to have feelings of constipation. Discussed plan for IR biopsy tomorrow. Patient is agreeable.     Objective :  Temp:  [97.7 °F (36.5 °C)-98.5 °F (36.9 °C)] 98.5 °F (36.9 °C)  HR:  [66-83] 69  BP: (141-186)/(61-79) 141/61  Resp:  [18] 18  SpO2:  [96 %-98 %] 97 %  O2 Device: None (Room air)    Body mass index is 26.47 kg/m².     Input and Output Summary (last 24 hours):   No intake or output data in the 24 hours ending 04/29/25 1522      Physical Exam  Vitals reviewed.   Constitutional:       General: He is awake. He is not in acute distress.     Appearance: Normal appearance. He is well-developed, well-groomed and overweight.   Cardiovascular:      Rate and Rhythm:  Normal rate and regular rhythm.      Heart sounds: Normal heart sounds. No murmur heard.  Pulmonary:      Effort: Pulmonary effort is normal. No respiratory distress.      Breath sounds: Normal breath sounds.   Abdominal:      General: Bowel sounds are normal.      Tenderness: There is no abdominal tenderness. There is no guarding.   Musculoskeletal:      Right lower leg: No edema.      Left lower leg: No edema.   Skin:     General: Skin is warm and dry.   Neurological:      General: No focal deficit present.      Mental Status: He is alert and oriented to person, place, and time. Mental status is at baseline.   Psychiatric:         Mood and Affect: Mood normal.         Behavior: Behavior normal. Behavior is cooperative.         Lines/Drains:            Lab Results: I have reviewed the following results:   Results from last 7 days   Lab Units 04/27/25  0515 04/26/25  1234   WBC Thousand/uL 6.46 7.00   HEMOGLOBIN g/dL 10.5* 11.4*   HEMATOCRIT % 32.1* 34.8*   PLATELETS Thousands/uL 228 211   SEGS PCT %  --  54   LYMPHO PCT %  --  34   MONO PCT %  --  11   EOS PCT %  --  1     Results from last 7 days   Lab Units 04/27/25  0515 04/26/25  1234   SODIUM mmol/L 139 139   POTASSIUM mmol/L 3.9 4.6   CHLORIDE mmol/L 103 105   CO2 mmol/L 30 30   BUN mg/dL 13 17   CREATININE mg/dL 0.91 1.00   ANION GAP mmol/L 6 4   CALCIUM mg/dL 9.3 9.6   ALBUMIN g/dL  --  4.2   TOTAL BILIRUBIN mg/dL  --  0.98   ALK PHOS U/L  --  670*   ALT U/L  --  17   AST U/L  --  34   GLUCOSE RANDOM mg/dL 149* 115     Results from last 7 days   Lab Units 04/29/25  0451   INR  0.97                   Recent Cultures (last 7 days):         Imaging Results Review: No pertinent imaging studies reviewed.  Other Study Results Review: No additional pertinent studies reviewed.    Last 24 Hours Medication List:     Current Facility-Administered Medications:     [Held by provider] Abiraterone Acetate TABS 1,000 mg, Daily    acetaminophen (TYLENOL) tablet 650 mg, Q6H  PRN    aluminum-magnesium hydroxide-simethicone (MAALOX) oral suspension 30 mL, Q6H PRN    atorvastatin (LIPITOR) tablet 40 mg, Daily With Dinner    bictegravir-emtricitab-tenofovir alafenamide (BIKTARVY) -25 MG tablet 1 tablet, Daily With Breakfast    folic acid (FOLVITE) tablet 1,000 mcg, Daily    gabapentin (NEURONTIN) capsule 300 mg, HS    heparin (porcine) subcutaneous injection 5,000 Units, Q8H MELINDA    hydrALAZINE (APRESOLINE) injection 10 mg, Q4H PRN    magnesium citrate (CITROMA) oral solution 296 mL, Once    metoprolol succinate (TOPROL-XL) 24 hr tablet 50 mg, Daily    mineral oil enema 1 enema, Once    ondansetron (ZOFRAN) injection 4 mg, Q6H PRN    oxyCODONE (ROXICODONE) immediate release tablet 20 mg, Q4H PRN    senna-docusate sodium (SENOKOT S) 8.6-50 mg per tablet 2 tablet, BID    zolpidem (AMBIEN) tablet 10 mg, Daily PRN    Administrative Statements   Today, Patient Was Seen By: Rossana Hughes PA-C  I have spent a total time of 38 minutes in caring for this patient on the day of the visit/encounter including Diagnostic results, Risks and benefits of tx options, Patient and family education, Counseling / Coordination of care, Documenting in the medical record, Reviewing/placing orders in the medical record (including tests, medications, and/or procedures), and Communicating with other healthcare professionals .    **Please Note: This note may have been constructed using a voice recognition system.**

## 2025-04-29 NOTE — CASE MANAGEMENT
Case Management Assessment & Discharge Planning Note    Patient name Caesar Hurd  Location /-01 MRN [id number]  : 1955 Date 2025       Current Admission Date: 2025  Current Admission Diagnosis:Constipation   Patient Active Problem List    Diagnosis Date Noted Date Diagnosed    Lesion of liver 2025     Constipation 2025     Rectal mass 2025     Cancer of multiple primary sites (HCC) 2025     Stage 3a chronic kidney disease (HCC) 2021     Acquired absence of kidney 10/14/2019     Essential hypertension 10/14/2019     HIV infection (HCC) 10/14/2019       LOS (days): 3  Geometric Mean LOS (GMLOS) (days): 2.5  Days to GMLOS:-0.6     OBJECTIVE:    Risk of Unplanned Readmission Score: 11.27         Current admission status: Inpatient       Preferred Pharmacy:   [id number] - [place] - [address]  [address]  [place] [address]  Phone: [phone] Fax: [phone]    Primary Care Provider: Uri Strong MD    Primary Insurance: [facility]  Secondary Insurance: Replaced by [facility]    ASSESSMENT:  Active Health Care Proxies    There are no active Health Care Proxies on file.       Advance Directives  Does patient have a Health Care POA?: No  Was patient offered paperwork?: Yes (POA/AD paperwork reviewed and provided)  Does patient currently have a Health Care decision maker?: No (Patient does have children; unclear who he would name as his HCR)  Does patient have Advance Directives?: No  Was patient offered paperwork?: Yes (see above)  Primary Contact: brotherProsper     Readmission Root Cause  30 Day Readmission: No    Patient Information  Admitted from:: Home  Mental Status: Alert  During Assessment patient was accompanied by: Not accompanied during assessment  Assessment information provided by:: Patient  Primary Caregiver: Self  Support Systems: Children, Family members  [place]  What city do you live in?: [place]  Home entry access options. Select all that apply.: Stairs  Number of steps to enter home.: 3  Type of Current Residence: [facility]  Living Arrangements: Lives Alone    Activities of Daily Living Prior to Admission  Functional Status: Independent  Completes ADLs independently?: Yes  Ambulates independently?: Yes  Does patient use assisted devices?: No  Does patient currently own DME?: No  Does patient have a history of Outpatient Therapy (PT/OT)?: Yes  Does the patient have a history of Short-Term Rehab?: No  Does patient have a history of HHC?: Yes (unable to recall agency name)  Does patient currently have HHC?: No     Patient Information Continued  Does patient have prescription coverage?: Yes  Can the patient afford their medications and any related supplies (such as glucometers or test strips)?: Yes  Does patient receive dialysis treatments?: No  Does patient have a history of substance abuse?: No  Does patient have a history of Mental Health Diagnosis?: No     Means of Transportation  Means of Transport to Appts:: Drives Self    DISCHARGE DETAILS:    Discharge planning discussed with:: patient at bedside  Freedom of Choice: Yes  Comments - Freedom of Choice: CM met with patient at bedside to introduce self/role. Patient does not have POA/AD and is unsure who he would name to make decisions on his behalf.  POA/AD paperwork provided and reviewed at his request. CM offered to make a referral to Oncology SW for patient with [facility] if he is not already connected.  Patient does not have an Oncology SW on his team and is receptive to the referral. No other CM needs identified at this time.  CM contacted family/caregiver?: No- see comments (Patient declined CM offer)  Were Treatment Team discharge recommendations reviewed with patient/caregiver?: Yes  Did patient/caregiver verbalize understanding of patient care needs?: Yes  Were patient/caregiver advised of the risks associated  with not following Treatment Team discharge recommendations?: Yes     Requested Home Health Care         Is the patient interested in HHC at discharge?: No    DME Referral Provided  Referral made for DME?: No    Other Referral/Resources/Interventions Provided:  Interventions: Oncology SW  Referral Comments: CM called the [facility] YOV[facility] line at [phone] and LMOVM requesting referral for Oncology SW.     Treatment Team Recommendation: Home  Discharge Destination Plan:: Home  Transport at Discharge : Self (patient's care is in the parking lot)           Additional Comments: Patient lives alone in a ranch home w/ [address] [facility] in [place]. He is independent with ADLs and mobility and has no concerns navigating his home environment.  He does not currently have a PCP and is looking for a new one as his retired, but he does have connections to Pulmonology and Oncology in the meantime.  No other CM needs identified aside from the above interventions. CM Dept will continue to follow.

## 2025-04-29 NOTE — ASSESSMENT & PLAN NOTE
"Presents with 10 days of constipation, has been battling for months and was found to have a perirectal mass in January of this year.   Chronic constipation at this point, complicated by rectal mass  Bowel regimen, monitor stool output   Flexible sigmoidoscopy (4/28/25): \"Constipation likely secondary to extrinsic compression from pelvic mass. No mucosal lesion was seen on both colonoscopy or this flexible sigmoidoscopy.\"  Per IR, liver biopsy tomorrow  "

## 2025-04-29 NOTE — CONSULTS
Consultation - Oncology-Radiation   Name: Caesar Hurd 70 y.o. male I MRN: [id number]  Unit/Bed#: -01 I Date of Admission: 4/26/2025   Date of Service: 4/29/2025 I Hospital Day: 3   Inpatient consult to Radiation Oncology  Consult performed by: Marco A Cline MD  Consult ordered by: Rossana Hughes PA-C      Physician Requesting Evaluation: Cornelio Delgado DO   Reason for Evaluation / Principal Problem: None    Assessment & Plan  Rectal mass  Pt with a history of multiple cancers including recurrent prostate cancer now presenting with constipation and pelvic pain in the context of a large left sided perirectal mass and pelvic LAD. 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 We reviewed the role of palliative RT in his case, noting that given his prior history of prostate/pelvic RT additional RT would come with increased risk of complications. At this time we will await biopsy and discussion of systemic therapy options before planning on any RT. I additionally recommend MRI Lumbar spine to rule out nerve involvement given exam findings and LE weakness. Given his imaging findings and out limitations with regard to RT I expect systemic therapy may offer a better likelihood of symptom control at this juncture. I will remain available to re-evaluate the patient as needed pending biopsy results.     Summary:  - Follow-up biopsy results.  - Recommend MRI Lumbar spine  - No RT at present  - Follow-up medical oncology recommendations after pathologic diagnosis established.     Cancer of multiple primary sites (HCC)  As above.       History of Present Illness   Caesar Hurd is a 70 year old man with a history of multiple cancers including RCC (s/p nephrectomy 2000), lymphoma (s/p chemotherapy 2008), and prostate cancer (GS 5+5 disease initially diagnosed in 2019 s/p RT to 7000cGy/28fx and ADT x2  years in 2020). He has since developed PSA recurrence with PSA up to 69 in 1/2025. He was restarted on Lupron at an OSH in 2/2025.     Over recent months he has been struggling with constipation with imaging showing a left perirectal mass previously PSMA avid with multiple ill defined lesion in the liver. He did undergo prior colonoscopy with no evidence of intraluminal tumor. He is currently planned for biopsy to determine pathology of perirectal and liver tumors. He is seen today as an inpatient to discuss palliative RT.     Currently the patient is doing fair overall. He has had significant constipation over recent months (per EMR review) but especially over the past 10 days. He has no blood per rectum. He does have to strain to urinate and additionally has pelvic pain bilaterally. Due to this pain he has weakness in his legs, L>R. He has no numbness or anesthesia.     Historical Information   Medical History Review: I have reviewed the patient's PMH, PSH, Social History, Family History, Meds, and Allergies     Oncology History:   Cancer Staging   No matching staging information was found for the patient.    Oncology History    No history exists.     Current Facility-Administered Medications   Medication Dose Route Frequency Provider Last Rate Last Admin    [Held by provider] Abiraterone Acetate TABS 1,000 mg  1,000 mg Oral Daily Rossana Hughes PA-C        acetaminophen (TYLENOL) tablet 650 mg  650 mg Oral Q6H PRN TYRELL Mendoza-C   650 mg at 04/1955    aluminum-magnesium hydroxide-simethicone (MAALOX) oral suspension 30 mL  30 mL Oral Q6H PRN Rossana Hughes PA-C        atorvastatin (LIPITOR) tablet 40 mg  40 mg Oral Daily With Dinner TYRLEL Mendoza-C   40 mg at 04/28/25 1710    bictegravir-emtricitab-tenofovir alafenamide (BIKTARVY) -25 MG tablet 1 tablet  1 tablet Oral Daily With Breakfast Rossana Hughes PA-C   1 tablet at 04/29/25 1000    folic acid (FOLVITE) tablet 1,000 mcg  1,000 mcg  Oral Daily Rossana Hughes PA-C   1,000 mcg at 04/29/25 1000    gabapentin (NEURONTIN) capsule 300 mg  300 mg Oral HS AIDA MendozaC   300 mg at 04/28/25 2144    heparin (porcine) subcutaneous injection 5,000 Units  5,000 Units Subcutaneous Q8H MELINDA TYRELL Mendoza-C   5,000 Units at 04/29/25 0542    hydrALAZINE (APRESOLINE) injection 10 mg  10 mg Intravenous Q4H PRN AIDA MendozaC   10 mg at 04/29/25 0108    magnesium citrate (CITROMA) oral solution 296 mL  296 mL Oral Once Rossana Hughes PA-C        metoprolol succinate (TOPROL-XL) 24 hr tablet 50 mg  50 mg Oral Daily AIDA MendozaC   50 mg at 04/29/25 1003    mineral oil enema 1 enema  1 enema Rectal Once Rossana Hughes PA-C        ondansetron (ZOFRAN) injection 4 mg  4 mg Intravenous Q6H PRN Rossana Hughes PA-C        oxyCODONE (ROXICODONE) immediate release tablet 20 mg  20 mg Oral Q4H PRN Triston Yee MD   20 mg at 04/29/25 1201    senna-docusate sodium (SENOKOT S) 8.6-50 mg per tablet 2 tablet  2 tablet Oral BID Rossana Hughes PA-C   2 tablet at 04/29/25 1000    zolpidem (AMBIEN) tablet 10 mg  10 mg Oral Daily PRN Rossana Hughes PA-C   10 mg at 04/28/25 2344       Objective :  Temp:  [97.7 °F (36.5 °C)-98.4 °F (36.9 °C)] 97.7 °F (36.5 °C)  HR:  [66-83] 83  BP: (145-217)/(67-83) 145/67  Resp:  [18] 18  SpO2:  [96 %-98 %] 98 %  O2 Device: None (Room air)    Physical Exam  Well appearing. NAD.  No increased work of breathing.  Extremities warm and well perfused.   Strength 4/5 in LLE, 5/5 in RLE. Sensation intact to light touch throughout.   AUSTYN deferred.       Lab Results: I have reviewed the following results:  Lab Results   Component Value Date    K 3.9 04/27/2025     04/27/2025    CO2 30 04/27/2025    BUN 13 04/27/2025    CREATININE 0.91 04/27/2025    CALCIUM 9.3 04/27/2025    AST 34 04/26/2025    ALT 17 04/26/2025    ALKPHOS 670 (H) 04/26/2025    EGFR 86 04/27/2025     Lab Results   Component Value Date    WBC 6.46  04/27/2025    HGB 10.5 (L) 04/27/2025    HCT 32.1 (L) 04/27/2025    MCV 92 04/27/2025     04/27/2025     Lab Results   Component Value Date    NEUTROABS 3.68 04/26/2025       Imaging Results Review: I personally reviewed the following image studies in PACS and associated radiology reports: CT abdomen/pelvis. My interpretation of the radiology images/reports is: Left sided perirectal mass with ~50% circumferential involvement at the involved level. Multiple pelvic LNs and hepatic masses. .      Administrative Statements   I have spent a total time of 42 minutes in caring for this patient on the day of the visit/encounter including Diagnostic results, Risks and benefits of tx options, Documenting in the medical record, Reviewing/placing orders in the medical record (including tests, medications, and/or procedures), Obtaining or reviewing history  , and Communicating with other healthcare professionals .

## 2025-04-29 NOTE — PLAN OF CARE
Problem: PAIN - ADULT  Goal: Verbalizes/displays adequate comfort level or baseline comfort level  Description: Interventions:- Encourage patient to monitor pain and request assistance- Assess pain using appropriate pain scale- Administer analgesics based on type and severity of pain and evaluate response- Implement non-pharmacological measures as appropriate and evaluate response- Consider cultural and social influences on pain and pain management- Notify physician/advanced practitioner if interventions unsuccessful or patient reports new pain  Outcome: Progressing     Problem: INFECTION - ADULT  Goal: Absence or prevention of progression during hospitalization  Description: INTERVENTIONS:- Assess and monitor for signs and symptoms of infection- Monitor lab/diagnostic results- Monitor all insertion sites, i.e. indwelling lines, tubes, and drains- Monitor endotracheal if appropriate and nasal secretions for changes in amount and color- Knapp appropriate cooling/warming therapies per order- Administer medications as ordered- Instruct and encourage patient and family to use good hand hygiene technique- Identify and instruct in appropriate isolation precautions for identified infection/condition  Outcome: Progressing  Goal: Absence of fever/infection during neutropenic period  Description: INTERVENTIONS:- Monitor WBC  Outcome: Progressing     Problem: SAFETY ADULT  Goal: Patient will remain free of falls  Description: INTERVENTIONS:- Educate patient/family on patient safety including physical limitations- Instruct patient to call for assistance with activity - Consult OT/PT to assist with strengthening/mobility - Keep Call bell within reach- Keep bed low and locked with side rails adjusted as appropriate- Keep care items and personal belongings within reach- Initiate and maintain comfort rounds- Make Fall Risk Sign visible to staff- Offer Toileting every 2 Hours, in advance of need- Initiate/Maintain 2alarm-  Obtain necessary fall risk management equipment: 2- Apply yellow socks and bracelet for high fall risk patients- Consider moving patient to room near nurses station  Outcome: Progressing  Goal: Maintain or return to baseline ADL function  Description: INTERVENTIONS:-  Assess patient's ability to carry out ADLs; assess patient's baseline for ADL function and identify physical deficits which impact ability to perform ADLs (bathing, care of mouth/teeth, toileting, grooming, dressing, etc.)- Assess/evaluate cause of self-care deficits - Assess range of motion- Assess patient's mobility; develop plan if impaired- Assess patient's need for assistive devices and provide as appropriate- Encourage maximum independence but intervene and supervise when necessary- Involve family in performance of ADLs- Assess for home care needs following discharge - Consider OT consult to assist with ADL evaluation and planning for discharge- Provide patient education as appropriate  Outcome: Progressing  Goal: Maintains/Returns to pre admission functional level  Description: INTERVENTIONS:- Perform AM-PAC 6 Click Basic Mobility/ Daily Activity assessment daily.- Set and communicate daily mobility goal to care team and patient/family/caregiver. - Collaborate with rehabilitation services on mobility goals if consulted- Perform Range of Motion 2 times a day.- Reposition patient every 2 hours.- Dangle patient 2 times a day- Stand patient 2 times a day- Ambulate patient 2 times a day- Out of bed to chair 2 times a day - Out of bed for meals 2 times a day- Out of bed for toileting- Record patient progress and toleration of activity level   Outcome: Progressing     Problem: DISCHARGE PLANNING  Goal: Discharge to home or other facility with appropriate resources  Description: INTERVENTIONS:- Identify barriers to discharge w/patient and caregiver- Arrange for needed discharge resources and transportation as appropriate- Identify discharge learning  needs (meds, wound care, etc.)- Arrange for interpretive services to assist at discharge as needed- Refer to Case Management Department for coordinating discharge planning if the patient needs post-hospital services based on physician/advanced practitioner order or complex needs related to functional status, cognitive ability, or social support system  Outcome: Progressing     Problem: Knowledge Deficit  Goal: Patient/family/caregiver demonstrates understanding of disease process, treatment plan, medications, and discharge instructions  Description: Complete learning assessment and assess knowledge base.Interventions:- Provide teaching at level of understanding- Provide teaching via preferred learning methods  Outcome: Progressing

## 2025-04-29 NOTE — ANESTHESIA POSTPROCEDURE EVALUATION
Post-Op Assessment Note    CV Status:  Stable    Pain management: adequate       Mental Status:  Alert and awake   Hydration Status:  Euvolemic   PONV Controlled:  Controlled   Airway Patency:  Patent     Post Op Vitals Reviewed: Yes    No anethesia notable event occurred.    Staff: CRNA           Last Filed PACU Vitals:  Vitals Value Taken Time   Temp 97.9 °F (36.6 °C) 04/28/25 1145   Pulse 59 04/28/25 1200   /69 04/28/25 1200   Resp 18 04/28/25 1200   SpO2 96 % 04/28/25 1200       Modified Sally:     Vitals Value Taken Time   Activity 2 04/28/25 1200   Respiration 2 04/28/25 1200   Circulation 2 04/28/25 1200   Consciousness 2 04/28/25 1200   Oxygen Saturation 2 04/28/25 1200     Modified Sally Score: 10

## 2025-04-29 NOTE — ASSESSMENT & PLAN NOTE
"Identified earlier this year due to patient reporting sensation of something \"blocking him\" causing constipation. He underwent colonoscopy with biopsy 3/21/2025 through [facility] colorectal service. Biopsy was NOT consistent with colorectal cancer at that time.    CT abdomen/pelvis (4/26/25): There is a large rectal mass, with direct invasion of the mesorectal fascia, left seminal vesicle, left bladder base, left pelvic sidewall. There are multiple left iliac and multiple hepatic lesions, highly suspicious for metastatic disease. The prior outside comparison studies are not available for direct comparison at this time. There are also multiple sclerotic osseous lesions, consistent with metastatic disease. This may be separately related to the patient's prostate cancer.  GI consulted,  Flex sig performed 4/28 with random forceps biopsies  IR liver biopsy tomorrow   "

## 2025-04-30 ENCOUNTER — PREP FOR PROCEDURE (OUTPATIENT)
Dept: RADIOLOGY | Facility: HOSPITAL | Age: 70
End: 2025-04-30

## 2025-04-30 ENCOUNTER — APPOINTMENT (INPATIENT)
Dept: CT IMAGING | Facility: HOSPITAL | Age: 70
DRG: 392 | End: 2025-04-30
Payer: COMMERCIAL

## 2025-04-30 VITALS
TEMPERATURE: 97.9 F | HEIGHT: 66 IN | BODY MASS INDEX: 26.36 KG/M2 | OXYGEN SATURATION: 98 % | HEART RATE: 66 BPM | SYSTOLIC BLOOD PRESSURE: 145 MMHG | RESPIRATION RATE: 18 BRPM | WEIGHT: 164.02 LBS | DIASTOLIC BLOOD PRESSURE: 65 MMHG

## 2025-04-30 DIAGNOSIS — K76.9 LESION OF LIVER: Primary | ICD-10-CM

## 2025-04-30 PROCEDURE — 99239 HOSP IP/OBS DSCHRG MGMT >30: CPT | Performed by: PHYSICIAN ASSISTANT

## 2025-04-30 RX ORDER — AMLODIPINE BESYLATE 5 MG/1
5 TABLET ORAL DAILY
Status: DISCONTINUED | OUTPATIENT
Start: 2025-04-30 | End: 2025-04-30 | Stop reason: HOSPADM

## 2025-04-30 RX ORDER — SODIUM CHLORIDE 9 MG/ML
75 INJECTION, SOLUTION INTRAVENOUS CONTINUOUS
OUTPATIENT
Start: 2025-04-30

## 2025-04-30 RX ORDER — AMLODIPINE BESYLATE 5 MG/1
5 TABLET ORAL DAILY
Qty: 30 TABLET | Refills: 1 | Status: SHIPPED | OUTPATIENT
Start: 2025-05-01

## 2025-04-30 RX ADMIN — METOPROLOL SUCCINATE 50 MG: 50 TABLET, EXTENDED RELEASE ORAL at 08:24

## 2025-04-30 RX ADMIN — OXYCODONE HYDROCHLORIDE 20 MG: 10 TABLET ORAL at 08:39

## 2025-04-30 RX ADMIN — BICTEGRAVIR SODIUM, EMTRICITABINE, AND TENOFOVIR ALAFENAMIDE FUMARATE 1 TABLET: 50; 200; 25 TABLET ORAL at 08:26

## 2025-04-30 RX ADMIN — SENNOSIDES AND DOCUSATE SODIUM 2 TABLET: 50; 8.6 TABLET ORAL at 08:25

## 2025-04-30 RX ADMIN — ATORVASTATIN CALCIUM 40 MG: 40 TABLET, FILM COATED ORAL at 17:22

## 2025-04-30 RX ADMIN — ZOLPIDEM TARTRATE 10 MG: 5 TABLET, FILM COATED ORAL at 01:06

## 2025-04-30 RX ADMIN — FOLIC ACID 1000 MCG: 1 TABLET ORAL at 08:24

## 2025-04-30 RX ADMIN — AMLODIPINE BESYLATE 5 MG: 5 TABLET ORAL at 10:41

## 2025-04-30 NOTE — ASSESSMENT & PLAN NOTE
"Presents with 10 days of constipation, has been battling for months and was found to have a perirectal mass in January of this year.     Flexible sigmoidoscopy (4/28/25): \"Constipation likely secondary to extrinsic compression from pelvic mass. No mucosal lesion was seen on both colonoscopy or this flexible sigmoidoscopy.\"  Constipation resolved  "

## 2025-04-30 NOTE — ASSESSMENT & PLAN NOTE
Uncontrolled, 210/94 earlier, now improved to 172/75   Continue metoprolol  Started amlodipine 5 mg

## 2025-04-30 NOTE — QUICK NOTE
Interventional Radiology Quick Note    70-year-old male presented to interventional radiology this morning for liver mass biopsy.  Unfortunately, patient arrives to the unit with systolic blood pressure at 200 mmHg.  Will cancel case for today and reschedule for Friday, 5/2/2025.  Orders for n.p.o. after midnight have been placed.  Please ensure adequate control of blood pressure for biopsy to proceed.    ALBA Amanda

## 2025-04-30 NOTE — ASSESSMENT & PLAN NOTE
"Identified earlier this year due to patient reporting sensation of something \"blocking him\" causing constipation. He underwent colonoscopy with biopsy 3/21/2025 through [facility] colorectal service. Biopsy was NOT consistent with colorectal cancer at that time.    CT abdomen/pelvis (4/26/25) showed large rectal mass with hepatic lesions suspicious for metastatic disease  Flex sig performed (4/28/25) with random forceps biopsies, awaiting results  Outpatient IR liver biopsy  "

## 2025-04-30 NOTE — PLAN OF CARE
Problem: PAIN - ADULT  Goal: Verbalizes/displays adequate comfort level or baseline comfort level  Description: Interventions:- Encourage patient to monitor pain and request assistance- Assess pain using appropriate pain scale- Administer analgesics based on type and severity of pain and evaluate response- Implement non-pharmacological measures as appropriate and evaluate response- Consider cultural and social influences on pain and pain management- Notify physician/advanced practitioner if interventions unsuccessful or patient reports new pain  Outcome: Progressing     Problem: INFECTION - ADULT  Goal: Absence or prevention of progression during hospitalization  Description: INTERVENTIONS:- Assess and monitor for signs and symptoms of infection- Monitor lab/diagnostic results- Monitor all insertion sites, i.e. indwelling lines, tubes, and drains- Monitor endotracheal if appropriate and nasal secretions for changes in amount and color- [place] appropriate cooling/warming therapies per order- Administer medications as ordered- Instruct and encourage patient and family to use good hand hygiene technique- Identify and instruct in appropriate isolation precautions for identified infection/condition  Outcome: Progressing  Goal: Absence of fever/infection during neutropenic period  Description: INTERVENTIONS:- Monitor WBC  Outcome: Progressing     Problem: SAFETY ADULT  Goal: Patient will remain free of falls  Description: INTERVENTIONS:- Educate patient/family on patient safety including physical limitations- Instruct patient to call for assistance with activity - Consult OT/PT to assist with strengthening/mobility - Keep Call bell within reach- Keep bed low and locked with side rails adjusted as appropriate- Keep care items and personal belongings within reach- Initiate and maintain comfort rounds- Make Fall Risk Sign visible to staff- Offer Toileting every 2 Hours, in advance of need- Initiate/Maintain alarm-  Obtain necessary fall risk management equipment: - Apply yellow socks and bracelet for high fall risk patients- Consider moving patient to room near nurses station  Outcome: Progressing  Goal: Maintain or return to baseline ADL function  Description: INTERVENTIONS:-  Assess patient's ability to carry out ADLs; assess patient's baseline for ADL function and identify physical deficits which impact ability to perform ADLs (bathing, care of mouth/teeth, toileting, grooming, dressing, etc.)- Assess/evaluate cause of self-care deficits - Assess range of motion- Assess patient's mobility; develop plan if impaired- Assess patient's need for assistive devices and provide as appropriate- Encourage maximum independence but intervene and supervise when necessary- Involve family in performance of ADLs- Assess for home care needs following discharge - Consider OT consult to assist with ADL evaluation and planning for discharge- Provide patient education as appropriate  Outcome: Progressing  Goal: Maintains/Returns to pre admission functional level  Description: INTERVENTIONS:- Perform AM-PAC 6 Click Basic Mobility/ Daily Activity assessment daily.- Set and communicate daily mobility goal to care team and patient/family/caregiver. - Collaborate with rehabilitation services on mobility goals if consulted- Perform Range of Motion 3 times a day.- Reposition patient every 2 hours.- Dangle patient 3 times a day- Stand patient 3 times a day- Ambulate patient 3 times a day- Out of bed to chair 3 times a day - Out of bed for meals 3 times a day- Out of bed for toileting- Record patient progress and toleration of activity level   Outcome: Progressing     Problem: DISCHARGE PLANNING  Goal: Discharge to home or other facility with appropriate resources  Description: INTERVENTIONS:- Identify barriers to discharge w/patient and caregiver- Arrange for needed discharge resources and transportation as appropriate- Identify discharge learning  needs (meds, wound care, etc.)- Arrange for interpretive services to assist at discharge as needed- Refer to Case Management Department for coordinating discharge planning if the patient needs post-hospital services based on physician/advanced practitioner order or complex needs related to functional status, cognitive ability, or social support system  Outcome: Progressing     Problem: Knowledge Deficit  Goal: Patient/family/caregiver demonstrates understanding of disease process, treatment plan, medications, and discharge instructions  Description: Complete learning assessment and assess knowledge base.Interventions:- Provide teaching at level of understanding- Provide teaching via preferred learning methods  Outcome: Progressing

## 2025-04-30 NOTE — QUICK NOTE
Interventional Radiology Quick Note    70-year-old male who was initially scheduled for liver biopsy this morning which was canceled secondary to hypertension.  Patient rescheduled for Friday, however, patient wishes to schedule in outpatient setting.    Will request for admitting team to place outpatient IR referral.  Orders placed for liver biopsy.  Will await scheduling to contact patient for appointment.    ALBA Amanda

## 2025-04-30 NOTE — ASSESSMENT & PLAN NOTE
History of lymphoma, renal cell carcinoma, prostate adenocarcinoma; now with rectal mass concerning for cancer  Follow[facility], however is requesting second opinion

## 2025-04-30 NOTE — DISCHARGE SUMMARY
"Discharge Summary - Hospitalist   Name: Caesar Hurd 70 y.o. male I MRN: [id number]  Unit/Bed#: -[facility] I Date of Admission: 4/26/2025   Date of Service: 4/30/2025 I Hospital Day: 4     Assessment & Plan  Constipation  Presents with 10 days of constipation, has been battling for months and was found to have a perirectal mass in January of this year.     Flexible sigmoidoscopy (4/28/25): \"Constipation likely secondary to extrinsic compression from pelvic mass. No mucosal lesion was seen on both colonoscopy or this flexible sigmoidoscopy.\"  Constipation resolved  Rectal mass  Identified earlier this year due to patient reporting sensation of something \"blocking him\" causing constipation. He underwent colonoscopy with biopsy 3/21/2025 through [facility] colorectal service. Biopsy was NOT consistent with colorectal cancer at that time.    CT abdomen/pelvis (4/26/25) showed large rectal mass with hepatic lesions suspicious for metastatic disease  Flex sig performed (4/28/25) with random forceps biopsies, awaiting results  Outpatient IR liver biopsy  Essential hypertension  Uncontrolled, 210/94 earlier, now improved to 172/75   Continue metoprolol  Started amlodipine 5 mg  HIV infection (HCC)  Continue Biktarvy  Stage 3a chronic kidney disease (HCC)  Cr baseline 1.2-1.3, presenting Cr 1.00  Cancer of multiple primary sites (HCC)  History of lymphoma, renal cell carcinoma, prostate adenocarcinoma; now with rectal mass concerning for cancer  Followed by [facility] oncology teams, however is requesting second opinion      Medical Problems       Resolved Problems  Date Reviewed: 12/29/2023   None       Discharging Physician / Practitioner: Rossana Hughes PA-C  PCP: Uri Strong MD  Admission Date:   Admission Orders (From admission, onward)       Ordered        04/26/25 1505  INPATIENT ADMISSION  Once                          Discharge Date: 04/30/25    Consultations During Hospital " Stay:  Gastroenterology  Oncology-Radiation  Interventional Radiology    Procedures Performed:   Flexible Sigmoidoscopy (4/27/25)    Significant Findings / Test Results:   CT abdomen/pelvis (4/26/25): There is a large rectal mass, with direct invasion of the mesorectal fascia, left seminal vesicle, left bladder base, left pelvic sidewall. There are multiple left iliac and multiple hepatic lesions, highly suspicious for metastatic disease. The prior outside comparison studies are not available for direct comparison at this time. There are also multiple sclerotic osseous lesions, consistent with metastatic disease. This may be separately related to the patient's prostate cancer.  CT chest (4/29/25): (1) No metastatic disease identified involving the chest. (2) Multiple hepatic lesions again demonstrated, as seen on recent CT abdomen pelvis, indicative of hepatic metastases.     Incidental Findings:   None     Test Results Pending at Discharge (will require follow up):   Flex sig (4/28/25) rectal biopsies      Outpatient Tests Requested:  IR liver biopsy    Complications:  None    Reason for Admission: Constipation secondary to rectal mass    Hospital Course:   Caesar Hurd is a 70 y.o. male patient who originally presented to the hospital on 4/26/2025 due to constipation for 10 days. Upon arrival to ED a CT abdomen/pelvis was obtained which showed a large rectal mass with hepatic lesions suspicious for metastatic disease. ED provider discussed with GI who recommended admission for further workup and management. Upon admission patient was placed on an aggressive bowel regimen. Patient has since reported successful bowel movements. Per GI, a flexible sigmoidoscopy was performed to obtain additional biopsies, results pending. Due to hepatic lesions with possibility of metastatic disease, IR biopsy was recommended. However, due to patient's hypertension the procedure was unable to be performed. Patient was then started  "on amlodipine 5 mg daily. An outpatient IR liver biopsy is scheduled for      Please see above list of diagnoses and related plan for additional information.     Condition at Discharge: fair    Discharge Day Visit / Exam:     Subjective:  Patient was seen today after attempted IR biopsy. Discussed addition of amlodipine to help control BP. Patient requesting discharge today, discussed outpatient IR biopsy. Patient denies headache, visual changes, and abdominal pain.     Vitals: Blood Pressure: (!) 172/75 (04/30/25 1100)  Pulse: 56 (04/30/25 1020)  Temperature: 97.9 °F (36.6 °C) (04/30/25 1100)  Temp Source: Oral (04/30/25 1100)  Respirations: 18 (04/30/25 1100)  Height: 5' 6\" (167.6 cm) (04/26/25 1724)  Weight - Scale: 74.4 kg (164 lb 0.4 oz) (04/26/25 1724)  SpO2: 96 % (04/30/25 1020)    Physical Exam  Constitutional:       General: He is not in acute distress.  HENT:      Head: Normocephalic and atraumatic.   Eyes:      General: No scleral icterus.     Conjunctiva/sclera: Conjunctivae normal.   Cardiovascular:      Rate and Rhythm: Regular rhythm. Bradycardia present.      Heart sounds: Normal heart sounds. No murmur heard.     No friction rub.   Pulmonary:      Effort: Pulmonary effort is normal. No respiratory distress.      Breath sounds: Normal breath sounds. No wheezing or rhonchi.   Abdominal:      General: Bowel sounds are normal. There is no distension.      Tenderness: There is no abdominal tenderness. There is no guarding.   Musculoskeletal:      Right lower leg: No edema.      Left lower leg: No edema.   Skin:     General: Skin is warm and dry.      Coloration: Skin is not jaundiced or pale.   Neurological:      Mental Status: He is alert and oriented to person, place, and time.   Psychiatric:         Mood and Affect: Mood normal.         Behavior: Behavior normal.         Thought Content: Thought content normal.         Judgment: Judgment normal.       Discussion with Family: Patient declined call to " . Family not aware of diagnoses.     Discharge instructions/Information to patient and family:   See after visit summary for information provided to patient and family.      Provisions for Follow-Up Care:  See after visit summary for information related to follow-up care and any pertinent home health orders.      Mobility at time of Discharge:   Basic Mobility Inpatient Raw Score: 24  JH-HLM Goal: 8: Walk 250 feet or more  JH-HLM Achieved: 7: Walk 25 feet or more  HLM Goal NOT achieved. Continue to encourage mobility in post discharge setting.     Disposition:   Home    Planned Readmission: None    Discharge Medications:  See after visit summary for reconciled discharge medications provided to patient and/or family.      Administrative Statements   Discharge Statement:  I have spent a total time of 50 minutes in caring for this patient on the day of the visit/encounter. >30 minutes of time was spent on: Risks and benefits of tx options, Instructions for management, Patient and family education, Importance of tx compliance, Risk factor reductions, Impressions, Counseling / Coordination of care, Documenting in the medical record, Reviewing / ordering tests, medicine, procedures  , and Communicating with other healthcare professionals .    **Please Note: This note may have been constructed using a voice recognition system**

## 2025-05-02 PROCEDURE — 88305 TISSUE EXAM BY PATHOLOGIST: CPT | Performed by: PATHOLOGY

## 2025-05-05 ENCOUNTER — PATIENT OUTREACH (OUTPATIENT)
Dept: HEMATOLOGY ONCOLOGY | Facility: CLINIC | Age: 70
End: 2025-05-05

## 2025-05-05 ENCOUNTER — DOCUMENTATION (OUTPATIENT)
Dept: HEMATOLOGY ONCOLOGY | Facility: CLINIC | Age: 70
End: 2025-05-05

## 2025-05-05 DIAGNOSIS — C80.0: ICD-10-CM

## 2025-05-05 DIAGNOSIS — Z76.89 ENCOUNTER TO ESTABLISH CARE WITH NEW PROVIDER: Primary | ICD-10-CM

## 2025-05-05 NOTE — PROGRESS NOTES
PN please retrieve outside records.     Referral received/ Chart reviewed for work up completed   Referral to: Hem/Onc  Dx: Prostate Cancer    Imaging completed: [][facility] [x]Other: all scans from [facility] over the last year   [x] PET/CT   [x] MRI   [x] CT   [] US   [] Mammo   [] Bone scan   [] N/A    Pathology completed: [][facility] [x]Other:    Date: 9/13/2019- Prostate   Location: [facility]- report available in Care Everywhere scanned under 10/22/2019 surgical pathology   []N/A    Additional records needed: all in Care Everywhere   [] Genomic report   [] Genetic testing results   [] Office Note   [] Procedure/ Operative note   [] Lab results   [x] N/A      [] Radiation Oncology records retrieval needed (PN to route to rad/onc clerical pool once scheduled)  Date:  Location:           PSA Date:        Lab Results   Component Value Date    .640 (H) 04/29/2025    PSA 0.82 09/08/2020    PSA 1.03 04/08/2020

## 2025-05-06 NOTE — PROGRESS NOTES
Oncology Nurse Navigator received referral to provider within [facility]. I made outreach and spoke with Caesar about my role as an Oncology Nurse Navigator in providing clinical support based on evidence based outcomes, advocacy and assistance with navigating the health care system as we prepare him to establish with the Oncology team.     Evaluated for any barriers to care - none at this time   Caesar lives alone and is able to drive himself to his appnts. He states he is well supported by his family. Noted that his communication consent is from 2023 and contains only his brother, Prosper. We discussed the importance of updating this document to ensure that someone he trusts is able to assist him he he chooses.   Distress thermometer completed. Caesar scored himself 7/10. He admits to feeling anxious, fearful and sometimes depressed in regards to his current health.   Genetic testing - we discussed the benefits of genetics counseling for people with multiple types of cancer as well as extensive family Hx. He declined at this time.   Smoking status verified   Verified PCP/insurance on file- he states that he had seen the provider listed at some point but has not yet established with a true primary care provider. He requested a referral to Family Practice to establish care. Referral placed. He is aware they will call him. We also discussed Palliative Care especial for patients with complex conditions. I explained their possible role in his care. He states this is not a specialty that he has met with yet. Palliative referral placed for goals of care and oncology symptom management.   Discussed use of My Chart. Patient does not have access to a reliable computer and is not interested in the pp at s time. He is currently managing his appnts by adding them to his calendar as well at the text updates.     Do you have a history of cancer? yes - prostate  Have you ever received Radiation Therapy? yes    Where   did you receive RT? [facility]   When? 01/2024     Do you have any implantable devices?   [] Pacemaker  [] Pain stimulator  [] Defibrillator  [] Implanted sleep apnea device  [x] N/A    We discussed what to expect during upcoming Hem/Onc consult. I provided him with my direct contact info and confirmed they have the numbers to contact their providers. Reviewed upcoming appointments. Caesar expressed understanding and appreciation for the call.     Future Appointments   Date Time Provider Department Center   5/8/2025  8:30 AM [facility] ED 1 [facility]   5/29/2025 12:40 PM Denia Salas HEM ONC STR Practice-Onc

## 2025-05-08 ENCOUNTER — PATIENT OUTREACH (OUTPATIENT)
Dept: HEMATOLOGY ONCOLOGY | Facility: CLINIC | Age: 70
End: 2025-05-08

## 2025-05-08 ENCOUNTER — HOSPITAL ENCOUNTER (OUTPATIENT)
Dept: CT IMAGING | Facility: HOSPITAL | Age: 70
Discharge: HOME | End: 2025-05-08
Attending: NURSE PRACTITIONER

## 2025-05-08 NOTE — PROGRESS NOTES
Intake received, chart reviewed for need of external records.  Consulting: Dr. Salas  Scheduled on 5/269/25  Dx: prostate cancer   ICD: C61    [facility]  [phone]  If not uploaded electronically via [employer], disks will be sent directly to the Radiology Reading Room.

## 2025-05-09 ENCOUNTER — RESULTS FOLLOW-UP (OUTPATIENT)
Dept: GASTROENTEROLOGY | Facility: MEDICAL CENTER | Age: 70
End: 2025-05-09

## 2025-05-12 NOTE — TELEPHONE ENCOUNTER
----- Message from Trey Hansen MD sent at 5/9/2025 10:26 PM EDT -----  Rectal biopsy was normal.    [id number]

## 2025-05-14 ENCOUNTER — PATIENT OUTREACH (OUTPATIENT)
Dept: CASE MANAGEMENT | Facility: HOSPITAL | Age: 70
End: 2025-05-14

## 2025-05-19 ENCOUNTER — APPOINTMENT (EMERGENCY)
Dept: CT IMAGING | Facility: HOSPITAL | Age: 70
End: 2025-05-19
Payer: COMMERCIAL

## 2025-05-19 ENCOUNTER — APPOINTMENT (EMERGENCY)
Dept: RADIOLOGY | Facility: HOSPITAL | Age: 70
End: 2025-05-19
Payer: COMMERCIAL

## 2025-05-19 ENCOUNTER — HOSPITAL ENCOUNTER (OUTPATIENT)
Facility: HOSPITAL | Age: 70
Setting detail: OBSERVATION
Discharge: HOME | End: 2025-05-20
Attending: EMERGENCY MEDICINE | Admitting: FAMILY MEDICINE
Payer: COMMERCIAL

## 2025-05-19 DIAGNOSIS — N17.9 AKI (ACUTE KIDNEY INJURY) (HCC): ICD-10-CM

## 2025-05-19 DIAGNOSIS — R11.2 NAUSEA AND VOMITING: Primary | ICD-10-CM

## 2025-05-19 DIAGNOSIS — K59.00 CONSTIPATION: ICD-10-CM

## 2025-05-19 DIAGNOSIS — R10.9 ABDOMINAL PAIN: ICD-10-CM

## 2025-05-19 LAB
ALBUMIN SERPL BCG-MCNC: 4.7 G/DL (ref 3.5–5)
ALP SERPL-CCNC: 957 U/L (ref 34–104)
ALT SERPL W P-5'-P-CCNC: 16 U/L (ref 7–52)
ANION GAP SERPL CALCULATED.3IONS-SCNC: 12 MMOL/L (ref 4–13)
AST SERPL W P-5'-P-CCNC: 31 U/L (ref 13–39)
BASOPHILS # BLD AUTO: 0.02 THOUSANDS/ÂΜL (ref 0–0.1)
BASOPHILS NFR BLD AUTO: 0 % (ref 0–1)
BILIRUB SERPL-MCNC: 2.29 MG/DL (ref 0.2–1)
BNP SERPL-MCNC: 37 PG/ML (ref 0–100)
BUN SERPL-MCNC: 35 MG/DL (ref 5–25)
CALCIUM SERPL-MCNC: 9.8 MG/DL (ref 8.4–10.2)
CARDIAC TROPONIN I PNL SERPL HS: 24 NG/L (ref ?–50)
CHLORIDE SERPL-SCNC: 91 MMOL/L (ref 96–108)
CO2 SERPL-SCNC: 30 MMOL/L (ref 21–32)
CREAT SERPL-MCNC: 1.61 MG/DL (ref 0.6–1.3)
EOSINOPHIL # BLD AUTO: 0.01 THOUSAND/ÂΜL (ref 0–0.61)
EOSINOPHIL NFR BLD AUTO: 0 % (ref 0–6)
ERYTHROCYTE [DISTWIDTH] IN BLOOD BY AUTOMATED COUNT: 12.9 % (ref 11.6–15.1)
FLUAV RNA RESP QL NAA+PROBE: NEGATIVE
FLUBV RNA RESP QL NAA+PROBE: NEGATIVE
GFR SERPL CREATININE-BSD FRML MDRD: 43 ML/MIN/1.73SQ M
GLUCOSE SERPL-MCNC: 140 MG/DL (ref 65–140)
HCT VFR BLD AUTO: 39.4 % (ref 36.5–49.3)
HGB BLD-MCNC: 13.6 G/DL (ref 12–17)
IMM GRANULOCYTES # BLD AUTO: 0.04 THOUSAND/UL (ref 0–0.2)
IMM GRANULOCYTES NFR BLD AUTO: 0 % (ref 0–2)
LACTATE SERPL-SCNC: 1.2 MMOL/L (ref 0.5–2)
LIPASE SERPL-CCNC: 20 U/L (ref 11–82)
LYMPHOCYTES # BLD AUTO: 1.57 THOUSANDS/ÂΜL (ref 0.6–4.47)
LYMPHOCYTES NFR BLD AUTO: 12 % (ref 14–44)
MCH RBC QN AUTO: 30.4 PG (ref 26.8–34.3)
MCHC RBC AUTO-ENTMCNC: 34.5 G/DL (ref 31.4–37.4)
MCV RBC AUTO: 88 FL (ref 82–98)
MONOCYTES # BLD AUTO: 1.33 THOUSAND/ÂΜL (ref 0.17–1.22)
MONOCYTES NFR BLD AUTO: 10 % (ref 4–12)
NEUTROPHILS # BLD AUTO: 10.33 THOUSANDS/ÂΜL (ref 1.85–7.62)
NEUTS SEG NFR BLD AUTO: 78 % (ref 43–75)
NRBC BLD AUTO-RTO: 0 /100 WBCS
PLATELET # BLD AUTO: 271 THOUSANDS/UL (ref 149–390)
PMV BLD AUTO: 8.6 FL (ref 8.9–12.7)
POTASSIUM SERPL-SCNC: 3.6 MMOL/L (ref 3.5–5.3)
PROT SERPL-MCNC: 8.1 G/DL (ref 6.4–8.4)
RBC # BLD AUTO: 4.48 MILLION/UL (ref 3.88–5.62)
RSV RNA RESP QL NAA+PROBE: NEGATIVE
SARS-COV-2 RNA RESP QL NAA+PROBE: NEGATIVE
SODIUM SERPL-SCNC: 133 MMOL/L (ref 135–147)
WBC # BLD AUTO: 13.3 THOUSAND/UL (ref 4.31–10.16)

## 2025-05-19 PROCEDURE — 0241U HB NFCT DS VIR RESP RNA 4 TRGT: CPT

## 2025-05-19 PROCEDURE — 71046 X-RAY EXAM CHEST 2 VIEWS: CPT

## 2025-05-19 PROCEDURE — 80053 COMPREHEN METABOLIC PANEL: CPT

## 2025-05-19 PROCEDURE — 85025 COMPLETE CBC W/AUTO DIFF WBC: CPT

## 2025-05-19 PROCEDURE — 83880 ASSAY OF NATRIURETIC PEPTIDE: CPT

## 2025-05-19 PROCEDURE — 74177 CT ABD & PELVIS W/CONTRAST: CPT

## 2025-05-19 PROCEDURE — 83605 ASSAY OF LACTIC ACID: CPT

## 2025-05-19 PROCEDURE — 84484 ASSAY OF TROPONIN QUANT: CPT

## 2025-05-19 PROCEDURE — 83690 ASSAY OF LIPASE: CPT

## 2025-05-19 PROCEDURE — 93005 ELECTROCARDIOGRAM TRACING: CPT

## 2025-05-19 PROCEDURE — 36415 COLL VENOUS BLD VENIPUNCTURE: CPT

## 2025-05-19 RX ORDER — PANTOPRAZOLE SODIUM 40 MG/10ML
40 INJECTION, POWDER, LYOPHILIZED, FOR SOLUTION INTRAVENOUS ONCE
Status: COMPLETED | OUTPATIENT
Start: 2025-05-19 | End: 2025-05-19

## 2025-05-19 RX ORDER — ONDANSETRON 2 MG/ML
4 INJECTION INTRAMUSCULAR; INTRAVENOUS ONCE
Status: COMPLETED | OUTPATIENT
Start: 2025-05-19 | End: 2025-05-19

## 2025-05-19 RX ORDER — FAMOTIDINE 10 MG/ML
20 INJECTION, SOLUTION INTRAVENOUS ONCE
Status: COMPLETED | OUTPATIENT
Start: 2025-05-19 | End: 2025-05-19

## 2025-05-19 RX ADMIN — PANTOPRAZOLE SODIUM 40 MG: 40 INJECTION, POWDER, FOR SOLUTION INTRAVENOUS at 23:03

## 2025-05-19 RX ADMIN — ONDANSETRON 4 MG: 2 INJECTION INTRAMUSCULAR; INTRAVENOUS at 23:06

## 2025-05-19 RX ADMIN — SODIUM CHLORIDE 1000 ML: 0.9 INJECTION, SOLUTION INTRAVENOUS at 23:03

## 2025-05-19 RX ADMIN — IOHEXOL 100 ML: 350 INJECTION, SOLUTION INTRAVENOUS at 23:23

## 2025-05-19 RX ADMIN — FAMOTIDINE 20 MG: 10 INJECTION, SOLUTION INTRAVENOUS at 23:05

## 2025-05-20 VITALS
WEIGHT: 162.8 LBS | RESPIRATION RATE: 18 BRPM | TEMPERATURE: 98 F | BODY MASS INDEX: 24.67 KG/M2 | OXYGEN SATURATION: 94 % | SYSTOLIC BLOOD PRESSURE: 117 MMHG | HEIGHT: 68 IN | DIASTOLIC BLOOD PRESSURE: 58 MMHG | HEART RATE: 82 BPM

## 2025-05-20 PROBLEM — R11.2 NAUSEA AND VOMITING: Status: ACTIVE | Noted: 2025-05-20

## 2025-05-20 PROBLEM — C64.2 RENAL CELL CARCINOMA OF LEFT KIDNEY (HCC): Status: ACTIVE | Noted: 2024-03-28

## 2025-05-20 PROBLEM — C61 MALIGNANT NEOPLASM OF PROSTATE (HCC): Chronic | Status: ACTIVE | Noted: 2019-10-14

## 2025-05-20 PROBLEM — N17.9 AKI (ACUTE KIDNEY INJURY) (HCC): Status: ACTIVE | Noted: 2025-05-20

## 2025-05-20 LAB
2HR DELTA HS TROPONIN: -2 NG/L
ANION GAP SERPL CALCULATED.3IONS-SCNC: 9 MMOL/L (ref 4–13)
ATRIAL RATE: 71 BPM
BACTERIA UR QL AUTO: ABNORMAL /HPF
BILIRUB UR QL STRIP: NEGATIVE
BUN SERPL-MCNC: 29 MG/DL (ref 5–25)
CALCIUM SERPL-MCNC: 9.1 MG/DL (ref 8.4–10.2)
CARDIAC TROPONIN I PNL SERPL HS: 22 NG/L (ref ?–50)
CHLORIDE SERPL-SCNC: 98 MMOL/L (ref 96–108)
CLARITY UR: CLEAR
CO2 SERPL-SCNC: 29 MMOL/L (ref 21–32)
COLOR UR: YELLOW
CREAT SERPL-MCNC: 1.21 MG/DL (ref 0.6–1.3)
ERYTHROCYTE [DISTWIDTH] IN BLOOD BY AUTOMATED COUNT: 13 % (ref 11.6–15.1)
GFR SERPL CREATININE-BSD FRML MDRD: 61 ML/MIN/1.73SQ M
GLUCOSE SERPL-MCNC: 172 MG/DL (ref 65–140)
GLUCOSE UR STRIP-MCNC: NEGATIVE MG/DL
HCT VFR BLD AUTO: 37.6 % (ref 36.5–49.3)
HGB BLD-MCNC: 12.5 G/DL (ref 12–17)
HGB UR QL STRIP.AUTO: ABNORMAL
KETONES UR STRIP-MCNC: ABNORMAL MG/DL
LEUKOCYTE ESTERASE UR QL STRIP: NEGATIVE
MAGNESIUM SERPL-MCNC: 2.4 MG/DL (ref 1.9–2.7)
MCH RBC QN AUTO: 30.4 PG (ref 26.8–34.3)
MCHC RBC AUTO-ENTMCNC: 33.2 G/DL (ref 31.4–37.4)
MCV RBC AUTO: 92 FL (ref 82–98)
MUCOUS THREADS UR QL AUTO: ABNORMAL
NITRITE UR QL STRIP: NEGATIVE
NON-SQ EPI CELLS URNS QL MICRO: ABNORMAL /HPF
P AXIS: 70 DEGREES
PH UR STRIP.AUTO: 6.5 [PH]
PLATELET # BLD AUTO: 230 THOUSANDS/UL (ref 149–390)
PMV BLD AUTO: 8.9 FL (ref 8.9–12.7)
POTASSIUM SERPL-SCNC: 3.6 MMOL/L (ref 3.5–5.3)
PR INTERVAL: 198 MS
PROT UR STRIP-MCNC: ABNORMAL MG/DL
QRS AXIS: -55 DEGREES
QRSD INTERVAL: 134 MS
QT INTERVAL: 408 MS
QTC INTERVAL: 443 MS
RBC # BLD AUTO: 4.11 MILLION/UL (ref 3.88–5.62)
RBC #/AREA URNS AUTO: ABNORMAL /HPF
SODIUM SERPL-SCNC: 136 MMOL/L (ref 135–147)
SP GR UR STRIP.AUTO: >=1.05 (ref 1–1.03)
T WAVE AXIS: 49 DEGREES
UROBILINOGEN UR STRIP-ACNC: <2 MG/DL
VENTRICULAR RATE: 71 BPM
WBC # BLD AUTO: 12.23 THOUSAND/UL (ref 4.31–10.16)
WBC #/AREA URNS AUTO: ABNORMAL /HPF

## 2025-05-20 PROCEDURE — 93010 ELECTROCARDIOGRAM REPORT: CPT | Performed by: INTERNAL MEDICINE

## 2025-05-20 PROCEDURE — 81001 URINALYSIS AUTO W/SCOPE: CPT

## 2025-05-20 PROCEDURE — 83735 ASSAY OF MAGNESIUM: CPT | Performed by: FAMILY MEDICINE

## 2025-05-20 PROCEDURE — 85027 COMPLETE CBC AUTOMATED: CPT | Performed by: FAMILY MEDICINE

## 2025-05-20 PROCEDURE — 80048 BASIC METABOLIC PNL TOTAL CA: CPT | Performed by: FAMILY MEDICINE

## 2025-05-20 PROCEDURE — NC001 PR NO CHARGE: Performed by: PHYSICIAN ASSISTANT

## 2025-05-20 PROCEDURE — 99236 HOSP IP/OBS SAME DATE HI 85: CPT | Performed by: FAMILY MEDICINE

## 2025-05-20 PROCEDURE — 36415 COLL VENOUS BLD VENIPUNCTURE: CPT

## 2025-05-20 PROCEDURE — 84484 ASSAY OF TROPONIN QUANT: CPT

## 2025-05-20 RX ORDER — DULOXETIN HYDROCHLORIDE 60 MG/1
60 CAPSULE, DELAYED RELEASE ORAL 2 TIMES DAILY
Status: DISCONTINUED | OUTPATIENT
Start: 2025-05-20 | End: 2025-05-20 | Stop reason: HOSPADM

## 2025-05-20 RX ORDER — HEPARIN SODIUM 5000 [USP'U]/ML
5000 INJECTION, SOLUTION INTRAVENOUS; SUBCUTANEOUS EVERY 8 HOURS SCHEDULED
Status: DISCONTINUED | OUTPATIENT
Start: 2025-05-20 | End: 2025-05-20 | Stop reason: HOSPADM

## 2025-05-20 RX ORDER — POLYETHYLENE GLYCOL 3350 17 G/17G
17 POWDER, FOR SOLUTION ORAL DAILY
Qty: 510 G | Refills: 0 | Status: SHIPPED | OUTPATIENT
Start: 2025-05-21 | End: 2025-06-20

## 2025-05-20 RX ORDER — SODIUM CHLORIDE, SODIUM GLUCONATE, SODIUM ACETATE, POTASSIUM CHLORIDE, MAGNESIUM CHLORIDE, SODIUM PHOSPHATE, DIBASIC, AND POTASSIUM PHOSPHATE .53; .5; .37; .037; .03; .012; .00082 G/100ML; G/100ML; G/100ML; G/100ML; G/100ML; G/100ML; G/100ML
100 INJECTION, SOLUTION INTRAVENOUS CONTINUOUS
Status: DISCONTINUED | OUTPATIENT
Start: 2025-05-20 | End: 2025-05-20 | Stop reason: HOSPADM

## 2025-05-20 RX ORDER — FOLIC ACID 1 MG/1
1000 TABLET ORAL DAILY
Status: DISCONTINUED | OUTPATIENT
Start: 2025-05-20 | End: 2025-05-20 | Stop reason: HOSPADM

## 2025-05-20 RX ORDER — AMLODIPINE BESYLATE 5 MG/1
5 TABLET ORAL DAILY
Status: DISCONTINUED | OUTPATIENT
Start: 2025-05-20 | End: 2025-05-20 | Stop reason: HOSPADM

## 2025-05-20 RX ORDER — METOPROLOL SUCCINATE 50 MG/1
50 TABLET, EXTENDED RELEASE ORAL DAILY
Status: DISCONTINUED | OUTPATIENT
Start: 2025-05-20 | End: 2025-05-20 | Stop reason: HOSPADM

## 2025-05-20 RX ORDER — ACETAMINOPHEN 325 MG/1
650 TABLET ORAL EVERY 6 HOURS PRN
Status: DISCONTINUED | OUTPATIENT
Start: 2025-05-20 | End: 2025-05-20 | Stop reason: HOSPADM

## 2025-05-20 RX ORDER — POLYETHYLENE GLYCOL 3350 17 G/17G
17 POWDER, FOR SOLUTION ORAL DAILY
Status: DISCONTINUED | OUTPATIENT
Start: 2025-05-20 | End: 2025-05-20 | Stop reason: HOSPADM

## 2025-05-20 RX ORDER — OXYCODONE HYDROCHLORIDE 10 MG/1
30 TABLET ORAL EVERY 4 HOURS PRN
Refills: 0 | Status: DISCONTINUED | OUTPATIENT
Start: 2025-05-20 | End: 2025-05-20 | Stop reason: HOSPADM

## 2025-05-20 RX ORDER — ZOLPIDEM TARTRATE 5 MG/1
10 TABLET ORAL DAILY PRN
Status: DISCONTINUED | OUTPATIENT
Start: 2025-05-20 | End: 2025-05-20 | Stop reason: HOSPADM

## 2025-05-20 RX ORDER — ABIRATERONE 500 MG/1
1000 TABLET ORAL DAILY
Status: DISCONTINUED | OUTPATIENT
Start: 2025-05-20 | End: 2025-05-20 | Stop reason: HOSPADM

## 2025-05-20 RX ORDER — ROPINIROLE 1 MG/1
1 TABLET, FILM COATED ORAL
Status: DISCONTINUED | OUTPATIENT
Start: 2025-05-20 | End: 2025-05-20 | Stop reason: HOSPADM

## 2025-05-20 RX ORDER — ATORVASTATIN CALCIUM 40 MG/1
40 TABLET, FILM COATED ORAL DAILY
Status: DISCONTINUED | OUTPATIENT
Start: 2025-05-20 | End: 2025-05-20 | Stop reason: HOSPADM

## 2025-05-20 RX ORDER — AMOXICILLIN 250 MG
1 CAPSULE ORAL 2 TIMES DAILY
Status: DISCONTINUED | OUTPATIENT
Start: 2025-05-20 | End: 2025-05-20 | Stop reason: HOSPADM

## 2025-05-20 RX ORDER — PANTOPRAZOLE SODIUM 40 MG/10ML
40 INJECTION, POWDER, LYOPHILIZED, FOR SOLUTION INTRAVENOUS EVERY 12 HOURS SCHEDULED
Status: DISCONTINUED | OUTPATIENT
Start: 2025-05-20 | End: 2025-05-20 | Stop reason: HOSPADM

## 2025-05-20 RX ORDER — AMOXICILLIN 250 MG
1 CAPSULE ORAL 2 TIMES DAILY
Qty: 60 TABLET | Refills: 0 | Status: SHIPPED | OUTPATIENT
Start: 2025-05-20

## 2025-05-20 RX ADMIN — FOLIC ACID 1000 MCG: 1 TABLET ORAL at 09:01

## 2025-05-20 RX ADMIN — METOPROLOL SUCCINATE 50 MG: 50 TABLET, EXTENDED RELEASE ORAL at 09:01

## 2025-05-20 RX ADMIN — AMLODIPINE BESYLATE 5 MG: 5 TABLET ORAL at 09:01

## 2025-05-20 RX ADMIN — DULOXETINE HYDROCHLORIDE 60 MG: 60 CAPSULE, DELAYED RELEASE ORAL at 09:01

## 2025-05-20 RX ADMIN — BICTEGRAVIR SODIUM, EMTRICITABINE, AND TENOFOVIR ALAFENAMIDE FUMARATE 1 TABLET: 50; 200; 25 TABLET ORAL at 09:02

## 2025-05-20 RX ADMIN — HEPARIN SODIUM 5000 UNITS: 5000 INJECTION, SOLUTION INTRAVENOUS; SUBCUTANEOUS at 05:03

## 2025-05-20 RX ADMIN — ATORVASTATIN CALCIUM 40 MG: 40 TABLET, FILM COATED ORAL at 09:01

## 2025-05-20 RX ADMIN — SENNOSIDES AND DOCUSATE SODIUM 1 TABLET: 50; 8.6 TABLET ORAL at 08:59

## 2025-05-20 RX ADMIN — SODIUM CHLORIDE, SODIUM GLUCONATE, SODIUM ACETATE, POTASSIUM CHLORIDE, MAGNESIUM CHLORIDE, SODIUM PHOSPHATE, DIBASIC, AND POTASSIUM PHOSPHATE 100 ML/HR: .53; .5; .37; .037; .03; .012; .00082 INJECTION, SOLUTION INTRAVENOUS at 03:54

## 2025-05-20 NOTE — H&P
H&P - Hospitalist   Name: Caesar Hurd 70 y.o. male I MRN: [id number]  Unit/Bed#: -01 I Date of Admission: 5/19/2025   Date of Service: 5/20/2025 I Hospital Day: 0     Assessment & Plan  Nausea and vomiting  IV fluids and antiemetics  IV Protonix  Constipation  Placed on MiraLAX and senna s    TIMO (acute kidney injury) (HCC)  Secondary to nausea vomiting  Will place on IV fluids  Rectal mass  Biopsy which was negative, his hematology/oncology believe that he still does have rectal cancer.  Continue follow-up with hematology oncology    Cancer of multiple primary sites (HCC)  Metastatic prostate cancer.  Continue with Abiraterone daily   Follow up with heme/onc daily   Essential hypertension  Continue Norvasc and Lopressor  HIV infection (HCC)  Continue Biktarvy      Disposition  #1  IV fluids  #2  Start MiraLAX and senna S  #3  Patient feeling better, increase to regular diet  #4  Protonix        VTE Pharmacologic Prophylaxis: VTE Score: 5 High Risk (Score >/= 5) - Pharmacological DVT Prophylaxis Ordered: heparin. Sequential Compression Devices Ordered.  Code Status: Level 1 - Full Code       Anticipated Length of Stay: Patient will be admitted on an observation basis with an anticipated length of stay of less than 2 midnights secondary to nausea, vomiting, constipation, TIMO.    History of Present Illness   Chief Complaint: Nausea, vomiting, constipation    Caesar Hurd is a 70 y.o. male with a PMH of metastatic prostate cancer, recent rectal mass which is likely cancer, HIV, hypertension who presents with constipation and nausea vomiting.  The patient has been nauseous and vomiting x 3 days, and has not had a bowel meant.  On his last admission uses be on a bowel regimen which he has not been taking.  Patient is now feeling a bit better after some antiemetics and IV fluids.    Review of Systems   Constitutional:  Positive for appetite change.   Respiratory: Negative.     Cardiovascular: Negative.     Gastrointestinal:  Positive for constipation, nausea and vomiting.   Genitourinary: Negative.    Musculoskeletal: Negative.        Historical Information   Past Medical History:   Diagnosis Date    Prostate cancer (HCC)      Past Surgical History:   Procedure Laterality Date    KIDNEY SURGERY Left     removal     Social History     Tobacco Use    Smoking status: Never    Smokeless tobacco: Never   Vaping Use    Vaping status: Never Used   Substance and Sexual Activity    Alcohol use: Not Currently    Drug use: Never    Sexual activity: Not on file     E-Cigarette/Vaping    E-Cigarette Use Never User      E-Cigarette/Vaping Substances    Nicotine No     THC No     CBD No     Flavoring No     Other No     Unknown No      Family history non-contributory  Social History:  Marital Status: Single   Occupation:   Patient Pre-hospital Living Situation: Home  Patient Pre-hospital Level of Mobility: walks  Patient Pre-hospital Diet Restrictions: None    Meds/Allergies   I have reviewed home medications using recent Epic encounter.  Prior to Admission medications    Medication Sig Start Date End Date Taking? Authorizing Provider   Abiraterone Acetate 500 MG Take 1,000 mg by mouth daily 4/18/25   Historical Provider, MD   amitriptyline (ELAVIL) 50 mg tablet Take 50 mg by mouth 12/20/23 12/19/24  Historical Provider, MD   amLODIPine (NORVASC) 5 mg tablet Take 1 tablet (5 mg total) by mouth daily 5/1/25   Rossana Hugehs PA-C   aspirin (ECOTRIN LOW STRENGTH) 81 mg EC tablet Take 81 mg by mouth daily  Patient not taking: Reported on 4/26/2025    Historical Provider, MD   atorvastatin (LIPITOR) 40 mg tablet Take 40 mg by mouth daily 11/21/23   Historical Provider, MD   bictegravir-emtricitab-tenofovir alafenamide (Biktarvy) -25 MG tablet Take 1 tablet by mouth daily 8/8/23   Historical Provider, MD   DULoxetine (Cymbalta) 60 mg delayed release capsule Take 60 mg by mouth 2 (two) times a day 12/20/23 12/19/24  Historical  Provider, MD   folic acid (FOLVITE) 1 mg tablet Take 1,000 mcg by mouth daily 10/18/23   Historical Provider, MD   gabapentin (NEURONTIN) 300 mg capsule Take 300 mg by mouth Three times a day 12/20/23 12/19/24  Historical Provider, MD   lidocaine (LIDODERM) 5 % PLACE 1 PATCH ON THE SKIN DAILY. REMOVE & DISCARD PATCH WITHIN 12 HOURS OR AS DIRECTED BY MD 9/25/23   Historical Provider, MD   metoprolol succinate (TOPROL-XL) 50 mg 24 hr tablet Take 50 mg by mouth daily 11/18/23   Historical Provider, MD   oxyCODONE (ROXICODONE) 30 MG immediate release tablet Take 30 mg by mouth every 4 (four) hours as needed 12/20/23   Historical Provider, MD   rOPINIRole (REQUIP) 1 mg tablet Take 1 mg by mouth 12/20/23 12/19/24  Historical Provider, MD   traZODone (DESYREL) 150 mg tablet Take 150 mg by mouth 12/20/23 1/19/24  Historical Provider, MD   triamcinolone (KENALOG) 0.5 % cream Apply 1 application. topically 3 (three) times a day 9/1/23 8/31/24  Historical Provider, MD   zolpidem (AMBIEN) 10 mg tablet Take 10 mg by mouth daily as needed 12/20/23   Historical Provider, MD     No Known Allergies    Objective :  Temp:  [97.6 °F (36.4 °C)] 97.6 °F (36.4 °C)  HR:  [67-86] 77  BP: (119-174)/(62-79) 146/68  Resp:  [18-23] 18  SpO2:  [96 %-100 %] 96 %  O2 Device: None (Room air)    Physical Exam  Constitutional:       Appearance: Normal appearance. He is normal weight.   HENT:      Head: Normocephalic and atraumatic.      Mouth/Throat:      Mouth: Mucous membranes are moist.      Pharynx: Oropharynx is clear.     Eyes:      Extraocular Movements: Extraocular movements intact.      Conjunctiva/sclera: Conjunctivae normal.       Cardiovascular:      Rate and Rhythm: Normal rate and regular rhythm.      Pulses: Normal pulses.      Heart sounds: Normal heart sounds.   Pulmonary:      Effort: Pulmonary effort is normal.      Breath sounds: Normal breath sounds.   Abdominal:      General: There is no distension.      Palpations: Abdomen is  soft.      Tenderness: There is no abdominal tenderness. There is no guarding.     Musculoskeletal:         General: Normal range of motion.      Right lower leg: No edema.      Left lower leg: No edema.     Skin:     General: Skin is warm and dry.     Neurological:      General: No focal deficit present.      Mental Status: He is alert and oriented to person, place, and time. Mental status is at baseline.     Psychiatric:         Mood and Affect: Mood normal.         Behavior: Behavior normal.         Thought Content: Thought content normal.            Lab Results: I have reviewed the following results:  Results from last 7 days   Lab Units 05/19/25  2212   WBC Thousand/uL 13.30*   HEMOGLOBIN g/dL 13.6   HEMATOCRIT % 39.4   PLATELETS Thousands/uL 271   SEGS PCT % 78*   LYMPHO PCT % 12*   MONO PCT % 10   EOS PCT % 0     Results from last 7 days   Lab Units 05/19/25  2212   SODIUM mmol/L 133*   POTASSIUM mmol/L 3.6   CHLORIDE mmol/L 91*   CO2 mmol/L 30   BUN mg/dL 35*   CREATININE mg/dL 1.61*   ANION GAP mmol/L 12   CALCIUM mg/dL 9.8   ALBUMIN g/dL 4.7   TOTAL BILIRUBIN mg/dL 2.29*   ALK PHOS U/L 957*   ALT U/L 16   AST U/L 31   GLUCOSE RANDOM mg/dL 140             Lab Results   Component Value Date    HGBA1C 6.1 (H) 04/12/2021    HGBA1C 5.5 08/21/2018    HGBA1C 5.2 05/28/2018     Results from last 7 days   Lab Units 05/19/25  2302   LACTIC ACID mmol/L 1.2       Imaging Results Review: I reviewed radiology reports from this admission including: CT abdomen/pelvis.  Other Study Results Review: No additional pertinent studies reviewed.    Administrative Statements     Medical decision making  Diagnosis addressed: Uncomplicated, nausea vomiting diarrhea  Data:   Reviewed  CBC, CMP, CT abdomen pelvis, troponin  Ordered CBC, BMP,  Reviewed external notes from hematology oncology  Discussion of management with ER provider IV fluids and Zofran         Risk:  Prescription drug management: IV fluids, start MiraLAX and senna  S  Discussion for hospitalization with ER provider: For TIMO, nausea, vomiting, constipation        ** Please Note: This note has been constructed using a voice recognition system. **

## 2025-05-20 NOTE — NURSING NOTE
"Pt refuse Miralax & Protonix. Pt states \"I'm feeling better & dont need it\", despite education provided by [place] [facility] & this writer on disease process & medications.   "

## 2025-05-20 NOTE — DISCHARGE SUMMARY
Discharge Summary - Hospitalist   Name: Caesar Hurd 70 y.o. male I MRN: [id number]  Unit/Bed#: -01 I Date of Admission: 5/19/2025   Date of Service: 5/20/2025 I Hospital Day: 0     Assessment & Plan  Nausea and vomiting  Suspect in the setting of known rectal mass and constipation.  No bowel movement yet, but passing gas and nausea/vomiting has resolved.  He is independently ambulatory and stable for discharge home  Constipation  Twice daily Senokot-S, as needed daily MiraLAX, as needed daily Dulcolax  Patient is aware that he will need to follow-up with oncology as well as probably surgical oncology regarding the rectal mass, but he still needs to undergo liver lesion biopsy  TIMO (acute kidney injury) (HCC)  Secondary to nausea vomiting, improved with IV fluid  Rectal mass  Biopsy which was negative, his hematology/oncology believe that he still does have rectal cancer.  Continue follow-up with hematology oncology  Cancer of multiple primary sites (HCC)  Metastatic prostate cancer.  Continue with Abiraterone daily   Follow up with heme/onc   Essential hypertension  Continue Norvasc and Lopressor  HIV infection (HCC)  Continue Biktarvy       Discharging Physician / Practitioner: Rossana Hughes PA-C  PCP: Uri Strong MD  Admission Date:   Admission Orders (From admission, onward)       Ordered        05/20/25 0208  Place in Observation  Once                          Discharge Date: 05/20/25    Consultations During Hospital Stay:  None    Procedures Performed:   None     Significant Findings / Test Results:   XR chest 2 views  Result Date: 5/20/2025  Impression: No acute cardiopulmonary disease. Workstation performed: [id number]     CT abdomen pelvis with contrast  Result Date: 5/20/2025  Impression: 1.  No acute findings in the abdomen or pelvis. 2.  Redemonstrated infiltrative rectal mass with local invasion as described, overall similar to prior study. No bowel obstruction. 3.  Redemonstrated hepatic  "metastases, some of which are stable and others of which have slightly increased in size from prior study. 4.  Grossly stable retroperitoneal, pelvic, and inguinal lymphadenopathy in keeping with juan metastases. 5.  Overall unchanged sclerotic osseous metastases, favoring prostate metastases. Workstation performed: [id number]       No results found for this or any previous visit.      No results for input(s): \"BLOODCX\", \"SPUTUMCULTUR\", \"GRAMSTAIN\", \"URINECX\", \"WOUNDCULT\", \"BODYFLUIDCUL\", \"MRSACULTURE\", \"INFLUAPCR\", \"INFLUBPCR\", \"RSVPCR\", \"LEGIONELLAUR\", \"STPU\", \"CDIFFTOXINB\" in the last 72 hours.    Incidental Findings:   None other than noted above; I reviewed the above mentioned incidental findings with the patient and/or family and they expressed understanding.    Test Results Pending at Discharge (will require follow up):   None      Outpatient Tests Requested:  None     Complications:  None     Reason for Admission: Vomiting (Pt c/o vomiting x 3 days. )       Hospital Course:   Caesar Hurd is a 70 y.o. male with a PMH of metastatic prostate cancer, recent rectal mass which is likely cancer though biopsy negative by colonoscopy, long-standing controlled HIV, and hypertension who presents with constipation and nausea vomiting.  The patient has been nauseous and vomiting x 3 days, and has not had a bowel meant.  On his last admission uses be on a bowel regimen which he has not been taking.  Patient is now feeling a bit better after some antiemetics and IV fluids. He did well throughout the morning and is requesting to discharge home with bowel regimen. Strongly encouraged adherence and follow up with oncology as well as IR for liver biopsy, as he missed his outpatient appointment for this.  Hemodynamically stable at this time to discharge home      Please see above list of diagnoses and related plan for additional information.     Condition at Discharge: stable    Discharge Day Visit / Exam:   Subjective: " "Offers no new complaints at this time, no BM but N/V gone and passing gas well. Tolerating food. No acute events reported overnight. Understanding of plan.  All questions answered to the best of my ability at this time to patient satisfaction. Plan of care agreed upon.  Vitals: Blood Pressure: 117/58 (05/20/25 0714)  Pulse: 82 (05/20/25 0714)  Temperature: 98 °F (36.7 °C) (05/20/25 0714)  Temp Source: Oral (05/20/25 0714)  Respirations: 18 (05/20/25 0714)  Height: 5' 8\" (172.7 cm) (05/20/25 0319)  Weight - Scale: 73.8 kg (162 lb 12.8 oz) (05/20/25 0319)  SpO2: 94 % (05/20/25 0714)  Exam:   Physical Exam  Vitals and nursing note reviewed.   Constitutional:       General: He is awake. He is not in acute distress.     Appearance: Normal appearance. He is well-developed. He is not ill-appearing or toxic-appearing.   HENT:      Head: Normocephalic and atraumatic.     Cardiovascular:      Rate and Rhythm: Normal rate.   Pulmonary:      Effort: Pulmonary effort is normal. No respiratory distress.   Abdominal:      General: Bowel sounds are normal. There is no distension.      Palpations: Abdomen is soft.      Tenderness: There is no abdominal tenderness.     Skin:     Coloration: Skin is not jaundiced or pale.     Neurological:      Mental Status: He is alert and oriented to person, place, and time.     Psychiatric:         Mood and Affect: Mood normal.         Behavior: Behavior normal. Behavior is cooperative.           Discussion with Family: Patient declined call to .     Discharge instructions/Information to patient and family:   See after visit summary for information provided to patient and family.      Provisions for Follow-Up Care:  See after visit summary for information related to follow-up care and any pertinent home health orders.       Mobility at time of Discharge:   Basic Mobility Inpatient Raw Score: 22  JH-HLM Goal: 7: Walk 25 feet or more  JH-HLM Achieved: 7: Walk 25 feet or more  HLM Goal " achieved. Continue to encourage appropriate mobility.    Disposition:   Home    Planned Readmission: none     Discharge Statement:  I spent 40 minutes discharging the patient. This time was spent on the day of discharge. I had direct contact with the patient on the day of discharge. Greater than 50% of the total time was spent examining patient, answering all patient questions, arranging and discussing plan of care with patient as well as directly providing post-discharge instructions.  Additional time then spent on discharge activities.    Discharge Medications:  See after visit summary for reconciled discharge medications provided to patient and/or family.      **Please Note: This note may have been constructed using a voice recognition system**

## 2025-05-20 NOTE — UTILIZATION REVIEW
Initial Clinical Review    Admission: Date/Time/Statement:  care started on 5/19/25 in ED and at time of observation order has crossed one midnight   Admission Orders (From admission, onward)       Ordered        05/20/25 0208  Place in Observation  Once                          Orders Placed This Encounter   Procedures    Place in Observation     Standing Status:   Standing     Number of Occurrences:   1     Level of Care:   Med Surg [16]     ED Arrival Information       Expected   -    Arrival   5/19/2025 21:11    Acuity   Urgent              Means of arrival   Walk-In    Escorted by   Self    Service   Hospitalist    Admission type   Emergency              Arrival complaint   Vomiting             Chief Complaint   Patient presents with    Vomiting     Pt c/o vomiting x 3 days.        Initial Presentation: 70 y.o. male  to ED via walk in from home.    Admitted to observation with Dx: TIMO/Nausea & Vomiting/constipation.    Presented to ED with nausea and vomiting starting 3 days prior to arrival, has upper abdomina pain and myalgias.  Constipation and unable to pass gas.  PMHx:HTN, HIV, lymphoma, renal cell carcinoma, s/p left nephrectomy, prostate adenocarcinoma, rectal mass.  On exam: abdominal tenderness.  Na 133.  Bun 35. Creatinine 1.61.  total bili 2.29.  wbc 13.30.      Imaging shows no acute findings in abdomen and pelvis.  Rectal mass similar to previous,  re demonstrated Hepatic metastasis.    ED treatment:  IVF bolus, Zofran, Pepcid and Protonix given.    Plan includes to continue IVF and IV Protonix.  Antiemetics as needed.  Trend BMP.  Start Bowel regimen.  Diet as tolerated.  OP follow up with heme-onc.  Continue abiraterone, Biktarvy and antihypertensives.     Anticipated Length of Stay/Certification Statement: Patient will be admitted on an observation basis with an anticipated length of stay of less than 2 midnights secondary to nausea, vomiting, constipation, TIMO.     Date: 5/20/25   Day 2: feels  better than admission.     ED Treatment-Medication Administration from 05/19/2025 2111 to 05/20/2025 0312         Date/Time Order Dose Route Action     05/19/2025 2303 sodium chloride 0.9 % bolus 1,000 mL 1,000 mL Intravenous New Bag     05/19/2025 2306 ondansetron (ZOFRAN) injection 4 mg 4 mg Intravenous Given     05/19/2025 2305 Famotidine (PF) (PEPCID) injection 20 mg 20 mg Intravenous Given     05/19/2025 2303 pantoprazole (PROTONIX) injection 40 mg 40 mg Intravenous Given            Scheduled Medications:  Abiraterone Acetate, 1,000 mg, Oral, Daily  amLODIPine, 5 mg, Oral, Daily  atorvastatin, 40 mg, Oral, Daily  bictegravir-emtricitab-tenofovir alafenamide, 1 tablet, Oral, Daily  DULoxetine, 60 mg, Oral, BID  folic acid, 1,000 mcg, Oral, Daily  heparin (porcine), 5,000 Units, Subcutaneous, Q8H MELINDA  metoprolol succinate, 50 mg, Oral, Daily  pantoprazole, 40 mg, Intravenous, Q12H MELINDA  polyethylene glycol, 17 g, Oral, Daily  rOPINIRole, 1 mg, Oral, HS  senna-docusate sodium, 1 tablet, Oral, BID      Continuous IV Infusions:  multi-electrolyte, 100 mL/hr, Intravenous, Continuous      PRN Meds:  acetaminophen, 650 mg, Oral, Q6H PRN  oxyCODONE, 30 mg, Oral, Q4H PRN  trimethobenzamide, 200 mg, Intramuscular, Q6H PRN  zolpidem, 10 mg, Oral, Daily PRN      ED Triage Vitals   Temperature Pulse Respirations Blood Pressure SpO2 Pain Score   05/19/25 2127 05/19/25 2127 05/19/25 2127 05/19/25 2127 05/19/25 2127 05/20/25 0319   97.6 °F (36.4 °C) 86 18 119/62 100 % 5     Weight (last 2 days)       Date/Time Weight    05/20/25 03:19:41 73.8 (162.8)            Vital Signs (last 3 days)       Date/Time Temp Pulse Resp BP MAP (mmHg) SpO2 O2 Device Patient Position - Orthostatic VS Pain    05/20/25 07:14:33 -- -- 18 117/58 78 -- -- -- --    05/20/25 03:19:41 97.7 °F (36.5 °C) 77 18 146/68 94 96 % None (Room air) Sitting 5    05/20/25 0200 -- 73 20 174/79 114 97 % -- -- --    05/20/25 0145 -- 67 23 174/79 -- 98 % -- -- --     05/19/25 2127 97.6 °F (36.4 °C) 86 18 119/62 84 100 % None (Room air) Sitting --              Pertinent Labs/Diagnostic Test Results:   Radiology:  CT abdomen pelvis with contrast   Final Interpretation by Venkat Boothe MD (05/20 0041)      1.  No acute findings in the abdomen or pelvis.   2.  Redemonstrated infiltrative rectal mass with local invasion as described, overall similar to prior study. No bowel obstruction.   3.  Redemonstrated hepatic metastases, some of which are stable and others of which have slightly increased in size from prior study.   4.  Grossly stable retroperitoneal, pelvic, and inguinal lymphadenopathy in keeping with juan metastases.   5.  Overall unchanged sclerotic osseous metastases, favoring prostate metastases.         Workstation performed: [id number]         XR chest 2 views   Final Interpretation by Caio Estrada MD (05/20 0830)      No acute cardiopulmonary disease.            Workstation performed: [id number]           Cardiology:  ECG 12 lead    by Interface, Ris Results In (05/19 2226)        GI:  No orders to display       Results from last 7 days   Lab Units 05/19/25 2212   SARS-COV-2  Negative     Results from last 7 days   Lab Units 05/20/25 0522 05/19/25 2212   WBC Thousand/uL 12.23* 13.30*   HEMOGLOBIN g/dL 12.5 13.6   HEMATOCRIT % 37.6 39.4   PLATELETS Thousands/uL 230 271   TOTAL NEUT ABS Thousands/µL  --  10.33*         Results from last 7 days   Lab Units 05/20/25 0522 05/19/25 2212   SODIUM mmol/L 136 133*   POTASSIUM mmol/L 3.6 3.6   CHLORIDE mmol/L 98 91*   CO2 mmol/L 29 30   ANION GAP mmol/L 9 12   BUN mg/dL 29* 35*   CREATININE mg/dL 1.21 1.61*   EGFR ml/min/1.73sq m 61 43   CALCIUM mg/dL 9.1 9.8   MAGNESIUM mg/dL 2.4  --      Results from last 7 days   Lab Units 05/19/25 2212   AST U/L 31   ALT U/L 16   ALK PHOS U/L 957*   TOTAL PROTEIN g/dL 8.1   ALBUMIN g/dL 4.7   TOTAL BILIRUBIN mg/dL 2.29*         Results from last 7 days   Lab Units 05/20/25 0522  "05/19/25  2212   GLUCOSE RANDOM mg/dL 172* 140             No results found for: \"BETA-HYDROXYBUTYRATE\"                   Results from last 7 days   Lab Units 05/20/25  0026 05/19/25 2212   HS TNI 0HR ng/L  --  24   HS TNI 2HR ng/L 22  --    HSTNI D2 ng/L -2  --                      Results from last 7 days   Lab Units 05/19/25  2302   LACTIC ACID mmol/L 1.2             Results from last 7 days   Lab Units 05/19/25  2212   BNP pg/mL 37                     Results from last 7 days   Lab Units 05/19/25  2212   LIPASE u/L 20                 Results from last 7 days   Lab Units 05/20/25  0025   CLARITY UA  Clear   COLOR UA  Yellow   SPEC GRAV UA  >=1.050*   PH UA  6.5   GLUCOSE UA mg/dl Negative   KETONES UA mg/dl Trace*   BLOOD UA  Trace*   PROTEIN UA mg/dl 70 (1+)*   NITRITE UA  Negative   BILIRUBIN UA  Negative   UROBILINOGEN UA (BE) mg/dl <2.0   LEUKOCYTES UA  Negative   WBC UA /hpf 1-2   RBC UA /hpf 1-2   BACTERIA UA /hpf None Seen   EPITHELIAL CELLS WET PREP /hpf Occasional   MUCUS THREADS  Occasional*     Results from last 7 days   Lab Units 05/19/25 2212   INFLUENZA A PCR  Negative   INFLUENZA B PCR  Negative   RSV PCR  Negative                                               Past Medical History:   Diagnosis Date    Prostate cancer (HCC)      Present on Admission:   Nausea and vomiting   Essential hypertension   HIV infection (HCC)   Constipation   Rectal mass   Cancer of multiple primary sites (HCC)   TIMO (acute kidney injury) (HCC)      Admitting Diagnosis: Vomiting [R11.10]  Abdominal pain [R10.9]  Nausea and vomiting [R11.2]  Constipation [K59.00]  TIMO (acute kidney injury) (HCC) [N17.9]  Age/Sex: 70 y.o. male    Network Utilization Review Department  ATTENTION: Please call with any questions or concerns to [phone] and carefully listen to the prompts so that you are directed to the right person. All voicemails are confidential.   For Discharge needs, contact Care Management DC Support Team at " [phone] opt. 2  Send all requests for admission clinical reviews, approved or denied determinations and any other requests to dedicated fax number below belonging to the campus where the patient is receiving treatment. List of dedicated fax numbers for the Facilities:  FACILITY NAME UR FAX NUMBER   ADMISSION DENIALS (Administrative/Medical Necessity) [phone]   DISCHARGE SUPPORT TEAM (NETWORK) [phone]   PARENT CHILD HEALTH (Maternity/NICU/Pediatrics) [phone]   [facility] [phone]   [facility] [phone]   [facility] [phone]   [facility] [phone]   [facility] [phone]   [facility] [phone]   [facility] [phone]   [facility] [phone]   [facility] [phone]   Critical access hospital [phone]   [facility] [phone]   [facility] [phone]

## 2025-05-20 NOTE — ASSESSMENT & PLAN NOTE
Lab Results   Component Value Date    EGFR 43 05/19/2025    EGFR 86 04/27/2025    EGFR 76 04/26/2025    CREATININE 1.61 (H) 05/19/2025    CREATININE 0.91 04/27/2025    CREATININE 1.00 04/26/2025

## 2025-05-20 NOTE — PLAN OF CARE
Problem: DISCHARGE PLANNING  Goal: Discharge to home or other facility with appropriate resources  Description: INTERVENTIONS:  - Identify barriers to discharge w/patient and caregiver  - Arrange for needed discharge resources and transportation as appropriate  - Identify discharge learning needs (meds, wound care, etc.)  - Arrange for interpretive services to assist at discharge as needed  - Refer to Case Management Department for coordinating discharge planning if the patient needs post-hospital services based on physician/advanced practitioner order or complex needs related to functional status, cognitive ability, or social support system  Outcome: Adequate for Discharge     Problem: PAIN - ADULT  Goal: Verbalizes/displays adequate comfort level or baseline comfort level  Description: Interventions:  - Encourage patient to monitor pain and request assistance  - Assess pain using appropriate pain scale  - Administer analgesics as ordered based on type and severity of pain and evaluate response  - Implement non-pharmacological measures as appropriate and evaluate response  - Consider cultural and social influences on pain and pain management  - Notify physician/advanced practitioner if interventions unsuccessful or patient reports new pain  - Educate patient/family on pain management process including their role and importance of  reporting pain   - Provide non-pharmacologic/complimentary pain relief interventions  Outcome: Adequate for Discharge     Problem: SAFETY ADULT  Goal: Patient will remain free of falls  Description: INTERVENTIONS:  - Educate patient/family on patient safety including physical limitations  - Instruct patient to call for assistance with activity   - Consider consulting OT/PT to assist with strengthening/mobility based on AM PAC & JH-HLM score  - Consult OT/PT to assist with strengthening/mobility   - Keep Call bell within reach  - Keep bed low and locked with side rails adjusted as  appropriate  - Keep care items and personal belongings within reach  - Initiate and maintain comfort rounds  - Make Fall Risk Sign visible to staff  - Outcome: Adequate for Discharge  Goal: Maintain or return to baseline ADL function  Description: INTERVENTIONS:  -  Assess patient's ability to carry out ADLs; assess patient's baseline for ADL function and identify physical deficits which impact ability to perform ADLs (bathing, care of mouth/teeth, toileting, grooming, dressing, etc.)  - Assess/evaluate cause of self-care deficits   - Assess range of motion  - Assess patient's mobility; develop plan if impaired  - Assess patient's need for assistive devices and provide as appropriate  - Encourage maximum independence but intervene and supervise when necessary  - Involve family in performance of ADLs  - Assess for home care needs following discharge   - Consider OT consult to assist with ADL evaluation and planning for discharge  - Provide patient education as appropriate  - Monitor functional capacity and physical performance, use of AM PAC & JH-HLM   - Monitor gait, balance and fatigue with ambulation    Outcome: Adequate for Discharge  Goal: Maintains/Returns to pre admission functional level  Description: INTERVENTIONS:  - Perform AM-PAC 6 Click Basic Mobility/ Daily Activity assessment daily.  - Set and communicate daily mobility goal to care team and patient/family/caregiver.   - Collaborate with rehabilitation services on mobility goals if consulted  - Perform Range of Motion 2 times a day.  - Reposition patient every 2 hours.  - Dangle patient 2 times a day  - Stand patient 2 times a day  - Ambulate patient 2 times a day  - Out of bed to chair 2 times a day   - Out of bed for meals 2 times a day  - Out of bed for toileting  - Record patient progress and toleration of activity level   Outcome: Adequate for Discharge     Problem: Knowledge Deficit  Goal: Patient/family/caregiver demonstrates understanding of  disease process, treatment plan, medications, and discharge instructions  Description: Complete learning assessment and assess knowledge base.  Interventions:  - Provide teaching at level of understanding  - Provide teaching via preferred learning methods  5/20/2025 1215 by Lorie Denney LPN  Outcome: Adequate for Discharge  5/20/2025 1154 by Lorie Denney LPN  Outcome: Progressing

## 2025-05-20 NOTE — ASSESSMENT & PLAN NOTE
Biopsy which was negative, his hematology/oncology believe that he still does have rectal cancer.  Continue follow-up with hematology oncology

## 2025-05-20 NOTE — PLAN OF CARE
Problem: DISCHARGE PLANNING  Goal: Discharge to home or other facility with appropriate resources  Description: INTERVENTIONS:  - Identify barriers to discharge w/patient and caregiver  - Arrange for needed discharge resources and transportation as appropriate  - Identify discharge learning needs (meds, wound care, etc.)  - Arrange for interpretive services to assist at discharge as needed  - Refer to Case Management Department for coordinating discharge planning if the patient needs post-hospital services based on physician/advanced practitioner order or complex needs related to functional status, cognitive ability, or social support system  Outcome: Progressing     Problem: PAIN - ADULT  Goal: Verbalizes/displays adequate comfort level or baseline comfort level  Description: Interventions:  - Encourage patient to monitor pain and request assistance  - Assess pain using appropriate pain scale  - Administer analgesics as ordered based on type and severity of pain and evaluate response  - Implement non-pharmacological measures as appropriate and evaluate response  - Consider cultural and social influences on pain and pain management  - Notify physician/advanced practitioner if interventions unsuccessful or patient reports new pain  - Educate patient/family on pain management process including their role and importance of  reporting pain   - Provide non-pharmacologic/complimentary pain relief interventions  Outcome: Progressing     Problem: SAFETY ADULT  Goal: Patient will remain free of falls  Description: INTERVENTIONS:  - Educate patient/family on patient safety including physical limitations  - Instruct patient to call for assistance with activity   - Consider consulting OT/PT to assist with strengthening/mobility based on AM PAC & JH-HLM score  - Consult OT/PT to assist with strengthening/mobility   - Keep Call bell within reach  - Keep bed low and locked with side rails adjusted as appropriate  - Keep care  items and personal belongings within reach  - Initiate and maintain comfort rounds  - Make Fall Risk Sign visible to staff  - Offer Toileting every 2 Hours, in advance of need  - Obtain necessary fall risk management equipment   - Apply yellow socks and bracelet for high fall risk patients  - Consider moving patient to room near nurses station  Outcome: Progressing  Goal: Maintain or return to baseline ADL function  Description: INTERVENTIONS:  -  Assess patient's ability to carry out ADLs; assess patient's baseline for ADL function and identify physical deficits which impact ability to perform ADLs (bathing, care of mouth/teeth, toileting, grooming, dressing, etc.)  - Assess/evaluate cause of self-care deficits   - Assess range of motion  - Assess patient's mobility; develop plan if impaired  - Assess patient's need for assistive devices and provide as appropriate  - Encourage maximum independence but intervene and supervise when necessary  - Involve family in performance of ADLs  - Assess for home care needs following discharge   - Consider OT consult to assist with ADL evaluation and planning for discharge  - Provide patient education as appropriate  - Monitor functional capacity and physical performance, use of AM PAC & -HLM   - Monitor gait, balance and fatigue with ambulation    Outcome: Progressing  Goal: Maintains/Returns to pre admission functional level  Description: INTERVENTIONS:  - Perform AM-PAC 6 Click Basic Mobility/ Daily Activity assessment daily.  - Set and communicate daily mobility goal to care team and patient/family/caregiver.   - Collaborate with rehabilitation services on mobility goals if consulted  - Perform Range of Motion 2 times a day.  - Reposition patient every 2 hours.  - Dangle patient 2 times a day  - Stand patient 2 times a day  - Ambulate patient 2 times a day  - Out of bed to chair 2 times a day   - Out of bed for meals 2 times a day  - Out of bed for toileting  - Record  patient progress and toleration of activity level   Outcome: Progressing     Problem: Knowledge Deficit  Goal: Patient/family/caregiver demonstrates understanding of disease process, treatment plan, medications, and discharge instructions  Description: Complete learning assessment and assess knowledge base.  Interventions:  - Provide teaching at level of understanding  - Provide teaching via preferred learning methods  Outcome: Progressing

## 2025-05-20 NOTE — ASSESSMENT & PLAN NOTE
Twice daily Senokot-S, as needed daily MiraLAX, as needed daily Dulcolax  Patient is aware that he will need to follow-up with oncology as well as probably surgical oncology regarding the rectal mass, but he still needs to undergo liver lesion biopsy

## 2025-05-20 NOTE — ASSESSMENT & PLAN NOTE
Suspect in the setting of known rectal mass and constipation.  No bowel movement yet, but passing gas and nausea/vomiting has resolved.  He is independently ambulatory and stable for discharge home

## 2025-05-20 NOTE — HOSPITAL COURSE
Caesar Hurd is a 70 y.o. male with a PMH of metastatic prostate cancer, recent rectal mass which is likely cancer though biopsy negative by colonoscopy, long-standing controlled HIV, and hypertension who presents with constipation and nausea vomiting.  The patient has been nauseous and vomiting x 3 days, and has not had a bowel meant.  On his last admission uses be on a bowel regimen which he has not been taking.  Patient is now feeling a bit better after some antiemetics and IV fluids. He did well throughout the morning and is requesting to discharge home with bowel regimen. Strongly encouraged adherence and follow up with oncology as well as IR for liver biopsy, as he missed his outpatient appointment for this.  Hemodynamically stable at this time to discharge home

## 2025-05-20 NOTE — ED PROVIDER NOTES
Time reflects when diagnosis was documented in both MDM as applicable and the Disposition within this note       Time User Action Codes Description Comment    5/20/2025  1:47 AM Beulah Gonsalez [R11.2] Nausea and vomiting     5/20/2025  1:47 AM Beulah Gonsalez [R10.9] Abdominal pain     5/20/2025  1:47 AM Beulah Gonsalez [N17.9] TIMO (acute kidney injury) (HCC)     5/20/2025  2:53 AM Beulah Gonsalez [K59.00] Constipation           ED Disposition       ED Disposition   Admit    Condition   Stable    Date/Time   Tue May 20, 2025  1:47 AM    Comment   Case was discussed with Dr. Nix and the patient's admission status was agreed to be Admission Status: observation status to the service of Dr. Nix .               Assessment & Plan       Medical Decision Making  VSS. Leukocytosis, 13.30. Acutely elevated creatinine, 1.61 (previously) 0.91. Elevated BUN. Normal lactic acid. CT without acute abnormality or bowel obstruction. CT consistent with metastases.  Due to solitary kidney with new TIMO, will admit to medicine.  Discussed ER results with patient.  Discussed with internal medicine.  Patient understands and consents to admission.    Amount and/or Complexity of Data Reviewed  Labs: ordered. Decision-making details documented in ED Course.  Radiology: ordered.    Risk  Prescription drug management.  Decision regarding hospitalization.        ED Course as of 05/20/25 0256   Mon May 19, 2025   2224 WBC(!): 13.30  6.46 x3 weeks ago   2238 Creatinine(!): 1.61  New TIMO, previously 0.91 x3 weeks ago   2244 hs TnI 0hr: 24   2330 LACTIC ACID: 1.2       Medications   trimethobenzamide (TIGAN) IM injection 200 mg (has no administration in time range)   pantoprazole (PROTONIX) injection 40 mg (has no administration in time range)   sodium chloride 0.9 % bolus 1,000 mL (1,000 mL Intravenous New Bag 5/19/25 2303)   ondansetron (ZOFRAN) injection 4 mg (4 mg Intravenous Given 5/19/25 2306)   Famotidine (PF) (PEPCID)  injection 20 mg (20 mg Intravenous Given 5/19/25 2305)   pantoprazole (PROTONIX) injection 40 mg (40 mg Intravenous Given 5/19/25 2303)   iohexol (OMNIPAQUE) 350 MG/ML injection (MULTI-DOSE) 100 mL (100 mL Intravenous Given 5/19/25 2323)       ED Risk Strat Scores   HEART Risk Score      Flowsheet Row Most Recent Value   Heart Score Risk Calculator    History 0 Filed at: 05/19/2025 2330   ECG 0 Filed at: 05/19/2025 2330   Age 2 Filed at: 05/19/2025 2330   Risk Factors 2 Filed at: 05/19/2025 2330   Troponin 1 Filed at: 05/19/2025 2330   HEART Score 5 Filed at: 05/19/2025 2330          HEART Risk Score      Flowsheet Row Most Recent Value   Heart Score Risk Calculator    History 0 Filed at: 05/19/2025 2330   ECG 0 Filed at: 05/19/2025 2330   Age 2 Filed at: 05/19/2025 2330   Risk Factors 2 Filed at: 05/19/2025 2330   Troponin 1 Filed at: 05/19/2025 2330   HEART Score 5 Filed at: 05/19/2025 2330                      No data recorded        SBIRT 20yo+      Flowsheet Row Most Recent Value   Initial Alcohol Screen: US AUDIT-C     1. How often do you have a drink containing alcohol? 0 Filed at: 05/19/2025 2127   2. How many drinks containing alcohol do you have on a typical day you are drinking?  0 Filed at: 05/19/2025 2127   3b. FEMALE Any Age, or MALE 65+: How often do you have 4 or more drinks on one occassion? 0 Filed at: 05/19/2025 2127   Audit-C Score 0 Filed at: 05/19/2025 2127   GARY: How many times in the past year have you...    Used an illegal drug or used a prescription medication for non-medical reasons? Never Filed at: 05/19/2025 2127                            History of Present Illness       Chief Complaint   Patient presents with    Vomiting     Pt c/o vomiting x 3 days.        Past Medical History:   Diagnosis Date    Prostate cancer (HCC)       Past Surgical History:   Procedure Laterality Date    KIDNEY SURGERY Left     removal      Family History   Problem Relation Age of Onset    No Known Problems  Mother     No Known Problems Father       Social History[1]   E-Cigarette/Vaping    E-Cigarette Use Never User       E-Cigarette/Vaping Substances    Nicotine No     THC No     CBD No     Flavoring No     Other No     Unknown No       I have reviewed and agree with the history as documented.     Patient is a 70-year-old male with a past medical hx of HTN, HIV, lymphoma, renal cell carcinoma, s/p left nephrectomy, prostate adenocarcinoma, rectal mass, who presents to the emergency room for nausea and vomiting.  Reports symptoms for x3 days.  Associated upper abdominal pain, myalgias.  Associated constipation and unable to pass gas.  Associated chest pain that has been on and off x3 days.  Denies any other symptoms.  Denies sick contacts.          Review of Systems   Constitutional:  Negative for chills and fever.   HENT:  Negative for ear pain, sore throat, trouble swallowing and voice change.    Eyes:  Negative for pain and visual disturbance.   Respiratory:  Negative for cough and shortness of breath.    Cardiovascular:  Positive for chest pain. Negative for palpitations.   Gastrointestinal:  Positive for abdominal pain, constipation, nausea and vomiting.   Genitourinary:  Negative for dysuria, flank pain and hematuria.   Musculoskeletal:  Negative for arthralgias and back pain.   Skin:  Negative for color change and rash.   Neurological:  Negative for seizures, syncope and headaches.   Psychiatric/Behavioral:  Negative for confusion.    All other systems reviewed and are negative.          Objective       ED Triage Vitals   Temperature Pulse Blood Pressure Respirations SpO2 Patient Position - Orthostatic VS   05/19/25 2127 05/19/25 2127 05/19/25 2127 05/19/25 2127 05/19/25 2127 05/19/25 2127   97.6 °F (36.4 °C) 86 119/62 18 100 % Sitting      Temp Source Heart Rate Source BP Location FiO2 (%) Pain Score    05/19/25 2123 05/19/25 2123 05/19/25 2123 -- --    Temporal Monitor Left arm        Vitals      Date and Time  Temp Pulse SpO2 Resp BP Pain Score FACES Pain Rating User   05/20/25 0200 -- 73 97 % 20 174/79 -- -- BS   05/20/25 0145 -- 67 98 % 23 174/79 -- -- BS   05/19/25 2127 97.6 °F (36.4 °C) 86 100 % 18 119/62 -- -- JK            Physical Exam  Vitals and nursing note reviewed.   Constitutional:       General: He is not in acute distress.     Appearance: Normal appearance. He is well-developed.   HENT:      Head: Normocephalic and atraumatic.     Eyes:      Conjunctiva/sclera: Conjunctivae normal.       Cardiovascular:      Rate and Rhythm: Normal rate and regular rhythm.      Pulses: Normal pulses.      Heart sounds: No murmur heard.  Pulmonary:      Effort: Pulmonary effort is normal. No respiratory distress.      Breath sounds: Normal breath sounds.   Abdominal:      Palpations: Abdomen is soft.      Tenderness: There is abdominal tenderness. There is no right CVA tenderness or left CVA tenderness.     Musculoskeletal:         General: No swelling.      Cervical back: Normal range of motion and neck supple.     Skin:     General: Skin is warm and dry.      Capillary Refill: Capillary refill takes less than 2 seconds.     Neurological:      General: No focal deficit present.      Mental Status: He is alert and oriented to person, place, and time.     Psychiatric:         Mood and Affect: Mood normal.         Results Reviewed       Procedure Component Value Units Date/Time    HS Troponin I 2hr [[id number]]  (Normal) Collected: 05/20/25 0026    Lab Status: Final result Specimen: Blood from Arm, Left Updated: 05/20/25 0054     hs TnI 2hr 22 ng/L      Delta 2hr hsTnI -2 ng/L     Urine Microscopic [[id number]]  (Abnormal) Collected: 05/20/25 0025    Lab Status: Final result Specimen: Urine, Clean Catch Updated: 05/20/25 0037     RBC, UA 1-2 /hpf      WBC, UA 1-2 /hpf      Epithelial Cells Occasional /hpf      Bacteria, UA None Seen /hpf      MUCUS THREADS Occasional    UA w Reflex to Microscopic w Reflex to Culture [[id number]]   (Abnormal) Collected: 05/20/25 0025    Lab Status: Final result Specimen: Urine, Clean Catch Updated: 05/20/25 0037     Color, UA Yellow     Clarity, UA Clear     Specific Gravity, UA >=1.050     pH, UA 6.5     Leukocytes, UA Negative     Nitrite, UA Negative     Protein, UA 70 (1+) mg/dl      Glucose, UA Negative mg/dl      Ketones, UA Trace mg/dl      Urobilinogen, UA <2.0 mg/dl      Bilirubin, UA Negative     Occult Blood, UA Trace    Lactic acid, plasma (w/reflex if result > 2.0) [[id number]]  (Normal) Collected: 05/19/25 2302    Lab Status: Final result Specimen: Blood from Arm, Left Updated: 05/19/25 2329     LACTIC ACID 1.2 mmol/L     Narrative:      Result may be elevated if tourniquet was used during collection.    FLU/RSV/COVID - if FLU/RSV clinically relevant (2hr TAT) [[id number]]  (Normal) Collected: 05/19/25 2212    Lab Status: Final result Specimen: Nares from Nose Updated: 05/19/25 2258     SARS-CoV-2 Negative     INFLUENZA A PCR Negative     INFLUENZA B PCR Negative     RSV PCR Negative    Narrative:      This test has been performed using the CoV-2/Flu/RSV plus assay on the [employer] GeneXpert platform. This test has been validated by the  and verified by the performing laboratory.     This test is designed to amplify and detect the following: nucleocapsid (N), envelope (E), and RNA-dependent RNA polymerase (RdRP) genes of the SARS-CoV-2 genome; matrix (M), basic polymerase (PB2), and acidic protein (PA) segments of the influenza A genome; matrix (M) and non-structural protein (NS) segments of the influenza B genome, and the nucleocapsid genes of RSV A and RSV B.     Positive results are indicative of the presence of Flu A, Flu B, RSV, and/or SARS-CoV-2 RNA. Positive results for SARS-CoV-2 or suspected novel influenza should be reported to state, local, or federal health departments according to local reporting requirements.      All results should be assessed in conjunction with clinical  presentation and other laboratory markers for clinical management.     FOR PEDIATRIC PATIENTS - copy/paste COVID Guidelines URL to browser: [email or web address]       HS Troponin 0hr (reflex protocol) [[id number]]  (Normal) Collected: 05/19/25 2212    Lab Status: Final result Specimen: Blood from Arm, Left Updated: 05/19/25 2242     hs TnI 0hr 24 ng/L     B-Type Natriuretic Peptide(BNP) [[id number]]  (Normal) Collected: 05/19/25 2212    Lab Status: Final result Specimen: Blood from Arm, Left Updated: 05/19/25 2241     BNP 37 pg/mL     Comprehensive metabolic panel [[id number]]  (Abnormal) Collected: 05/19/25 2212    Lab Status: Final result Specimen: Blood from Arm, Left Updated: 05/19/25 2235     Sodium 133 mmol/L      Potassium 3.6 mmol/L      Chloride 91 mmol/L      CO2 30 mmol/L      ANION GAP 12 mmol/L      BUN 35 mg/dL      Creatinine 1.61 mg/dL      Glucose 140 mg/dL      Calcium 9.8 mg/dL      AST 31 U/L      ALT 16 U/L      Alkaline Phosphatase 957 U/L      Total Protein 8.1 g/dL      Albumin 4.7 g/dL      Total Bilirubin 2.29 mg/dL      eGFR 43 ml/min/1.73sq m     Narrative:      National Kidney Disease Foundation guidelines for Chronic Kidney Disease (CKD):     Stage 1 with normal or high GFR (GFR > 90 mL/min/1.73 square meters)    Stage 2 Mild CKD (GFR = 60-89 mL/min/1.73 square meters)    Stage 3A Moderate CKD (GFR = 45-59 mL/min/1.73 square meters)    Stage 3B Moderate CKD (GFR = 30-44 mL/min/1.73 square meters)    Stage 4 Severe CKD (GFR = 15-29 mL/min/1.73 square meters)    Stage 5 End Stage CKD (GFR <15 mL/min/1.73 square meters)  Note: GFR calculation is accurate only with a steady state creatinine    Lipase [[id number]]  (Normal) Collected: 05/19/25 2212    Lab Status: Final result Specimen: Blood from Arm, Left Updated: 05/19/25 2235     Lipase 20 u/L     CBC and differential [[id number]]  (Abnormal) Collected: 05/19/25 4053    Lab Status: Final  result Specimen: Blood from Arm, Left Updated: 05/19/25 2221     WBC 13.30 Thousand/uL      RBC 4.48 Million/uL      Hemoglobin 13.6 g/dL      Hematocrit 39.4 %      MCV 88 fL      MCH 30.4 pg      MCHC 34.5 g/dL      RDW 12.9 %      MPV 8.6 fL      Platelets 271 Thousands/uL      nRBC 0 /100 WBCs      Segmented % 78 %      Immature Grans % 0 %      Lymphocytes % 12 %      Monocytes % 10 %      Eosinophils Relative 0 %      Basophils Relative 0 %      Absolute Neutrophils 10.33 Thousands/µL      Absolute Immature Grans 0.04 Thousand/uL      Absolute Lymphocytes 1.57 Thousands/µL      Absolute Monocytes 1.33 Thousand/µL      Eosinophils Absolute 0.01 Thousand/µL      Basophils Absolute 0.02 Thousands/µL             CT abdomen pelvis with contrast   Final Interpretation by Venkat Boothe MD (05/20 0041)      1.  No acute findings in the abdomen or pelvis.   2.  Redemonstrated infiltrative rectal mass with local invasion as described, overall similar to prior study. No bowel obstruction.   3.  Redemonstrated hepatic metastases, some of which are stable and others of which have slightly increased in size from prior study.   4.  Grossly stable retroperitoneal, pelvic, and inguinal lymphadenopathy in keeping with juan metastases.   5.  Overall unchanged sclerotic osseous metastases, favoring prostate metastases.         Workstation performed: [id number]         XR chest 2 views    (Results Pending)       ECG 12 Lead Documentation Only    Date/Time: 5/19/2025 10:26 PM    Performed by: Beulah Gonsalez PA-C  Authorized by: Beulah Gonsalez PA-C    Indications / Diagnosis:  Cardiac work-up  ECG reviewed by me, the ED Provider: yes    Patient location:  ED  Interpretation:     Interpretation: normal    Rate:     ECG rate:  71    ECG rate assessment: normal    Rhythm:     Rhythm: sinus rhythm    Conduction:     Conduction: abnormal      Abnormal conduction: complete RBBB, LAFB and bifascicular block    ST segments:     ST  segments:  Normal  T waves:     T waves: normal        ED Medication and Procedure Management   Prior to Admission Medications   Prescriptions Last Dose Informant Patient Reported? Taking?   Abiraterone Acetate 500 MG   Yes No   Sig: Take 1,000 mg by mouth daily   DULoxetine (Cymbalta) 60 mg delayed release capsule  Self Yes No   Sig: Take 60 mg by mouth 2 (two) times a day   amLODIPine (NORVASC) 5 mg tablet   No No   Sig: Take 1 tablet (5 mg total) by mouth daily   amitriptyline (ELAVIL) 50 mg tablet  Self Yes No   Sig: Take 50 mg by mouth   aspirin (ECOTRIN LOW STRENGTH) 81 mg EC tablet  Self Yes No   Sig: Take 81 mg by mouth daily   Patient not taking: Reported on 4/26/2025   atorvastatin (LIPITOR) 40 mg tablet  Self Yes No   Sig: Take 40 mg by mouth daily   bictegravir-emtricitab-tenofovir alafenamide (Biktarvy) -25 MG tablet  Self Yes No   Sig: Take 1 tablet by mouth daily   folic acid (FOLVITE) 1 mg tablet  Self Yes No   Sig: Take 1,000 mcg by mouth daily   gabapentin (NEURONTIN) 300 mg capsule  Self Yes No   Sig: Take 300 mg by mouth Three times a day   lidocaine (LIDODERM) 5 %  Self Yes No   Sig: PLACE 1 PATCH ON THE SKIN DAILY. REMOVE & DISCARD PATCH WITHIN 12 HOURS OR AS DIRECTED BY MD   metoprolol succinate (TOPROL-XL) 50 mg 24 hr tablet  Self Yes No   Sig: Take 50 mg by mouth daily   oxyCODONE (ROXICODONE) 30 MG immediate release tablet  Self Yes No   Sig: Take 30 mg by mouth every 4 (four) hours as needed   rOPINIRole (REQUIP) 1 mg tablet  Self Yes No   Sig: Take 1 mg by mouth   traZODone (DESYREL) 150 mg tablet  Self Yes No   Sig: Take 150 mg by mouth   triamcinolone (KENALOG) 0.5 % cream  Self Yes No   Sig: Apply 1 application. topically 3 (three) times a day   zolpidem (AMBIEN) 10 mg tablet  Self Yes No   Sig: Take 10 mg by mouth daily as needed      Facility-Administered Medications: None     Patient's Medications   Discharge Prescriptions    No medications on file     No discharge procedures  on file.  ED SEPSIS DOCUMENTATION   Time reflects when diagnosis was documented in both MDM as applicable and the Disposition within this note       Time User Action Codes Description Comment    5/20/2025  1:47 AM Beulah Gonsalez [R11.2] Nausea and vomiting     5/20/2025  1:47 AM Beulah Gonsalez [R10.9] Abdominal pain     5/20/2025  1:47 AM Beulah Gonsalez [N17.9] TIMO (acute kidney injury) (HCC)     5/20/2025  2:53 AM Beulah Gonsalez [K59.00] Constipation                    [1]   Social History  Tobacco Use    Smoking status: Never    Smokeless tobacco: Never   Vaping Use    Vaping status: Never Used   Substance Use Topics    Alcohol use: Not Currently    Drug use: Never        Beulah Gonsalez PA-C  05/20/25 0257

## 2025-05-20 NOTE — DISCHARGE INSTR - AVS FIRST PAGE
Dear Caesar Hurd,     It was our pleasure to care for you here at [facility].  It is our hope that we were always able to exceed the expected standards for your care during your stay.  You were hospitalized due to constipation.  You were cared for by Rossana Hughes PA-C under the service of Juni Roth,* with the [facility] who covers for your primary care physician (PCP), Uri Strong MD, while you were hospitalized.  If you have any questions or concerns related to this hospitalization, you may contact us at .  For follow up as well as any medication refills, we recommend that you follow up with your primary care physician.  A registered nurse will reach out to you by phone within a few days after your discharge to answer any additional questions that you may have after going home.  However, at this time we provide for you here, the most important instructions / recommendations at discharge:     Notable Medication Adjustments -   Senokot twice a day, every day  Miralax daily as needed if no bowel movement (powder)  Bisacodyl/dulcolax can be use as needed as well (pull)  Testing Required after Discharge -   None   Important follow up information -   Follow up with oncology   Other Instructions -   Return to ER if unable to eat/drink or have BM, intractable nausea/vomiting, or fever   Please review this entire after visit summary as additional general instructions including medication list, appointments, activity, diet, any pertinent wound care, and other additional recommendations from your care team that may be provided for you.      Sincerely,     Rossana Hughes PA-C

## 2025-05-23 ENCOUNTER — PATIENT OUTREACH (OUTPATIENT)
Dept: CASE MANAGEMENT | Facility: HOSPITAL | Age: 70
End: 2025-05-23

## 2025-05-23 NOTE — PROGRESS NOTES
Biopsychosocial and Barriers Assessment    Type of Cancer: rectal mass, h/o prostate cancer  Treatment plan: TBD, consults next week   Noted barriers to care: none noted at this time  Cultural/Evangelical concerns: none noted  Hair Loss/ Wig resources needed: not discussed today    DT completed: 5/5   DT score: 7  Issues noted: anxiety, depression    Marital status/Lives with: single, lives alone  Pt's support system: pt reports a lot of family support  Mental Health history: none noted  Substance Abuse: none noted    Employment/income source: disabled  Concerns with bills (treatment vs household): not at this time  Noted issues with home: none noted    Narrative note:      MSW s/w pt by phone this afternoon to introduce [facility] as a resource and assess for any needs.  Chart review competed, pt is being worked up for a rectal mass and has a h/o prostate cancer.  He has seen [facility] in the past but tells me today that he's had some issues with his care there and was looking to switch to [facility].  He has appts next week with both the colon/rectal surgeon and Hem Onc.  He was just inpt overnight a few days ago for constipation, nausea and vomiting and tells me that he has not yet had a bowel movement.  He says that he's not eating very much and is trying to stay hydrated, he is using miralax to help things along but so far has not been successful.    Pt tells me that he lives locally alone but has a lot of family support.  He is looking forward to getting more answers and hopefully treatment recommendations next week at his appts.  He denies any needs at this time but is agreeable to me checking in with him after next week's appts.  He took my direct number as well to reach out as needed in the interim.  He notes that he is getting calls but no VM is left from [facility], I will make a note on his demographics page to please LM when calling.  No other needs noted at this time.

## 2025-05-27 ENCOUNTER — OFFICE VISIT (OUTPATIENT)
Age: 70
End: 2025-05-27
Attending: PHYSICIAN ASSISTANT
Payer: COMMERCIAL

## 2025-05-27 VITALS
WEIGHT: 162 LBS | DIASTOLIC BLOOD PRESSURE: 59 MMHG | SYSTOLIC BLOOD PRESSURE: 119 MMHG | BODY MASS INDEX: 24.55 KG/M2 | HEIGHT: 68 IN | OXYGEN SATURATION: 99 % | HEART RATE: 70 BPM

## 2025-05-27 DIAGNOSIS — B20 CURRENTLY ASYMPTOMATIC HIV INFECTION, WITH HISTORY OF HIV-RELATED ILLNESS (HCC): Chronic | ICD-10-CM

## 2025-05-27 DIAGNOSIS — C80.0: Primary | ICD-10-CM

## 2025-05-27 DIAGNOSIS — C64.2 RENAL CELL CARCINOMA OF LEFT KIDNEY (HCC): ICD-10-CM

## 2025-05-27 DIAGNOSIS — K76.9 LESION OF LIVER: ICD-10-CM

## 2025-05-27 DIAGNOSIS — C61 MALIGNANT NEOPLASM OF PROSTATE (HCC): Chronic | ICD-10-CM

## 2025-05-27 PROCEDURE — 99204 OFFICE O/P NEW MOD 45 MIN: CPT | Performed by: COLON & RECTAL SURGERY

## 2025-05-27 NOTE — ASSESSMENT & PLAN NOTE
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  The patient has undergone chemotherapy for lymphoma, his prostate cancer previously and his renal cell cancer.  The patient send states that he does not wish to proceed with biopsy of his liver or pelvic lymph nodes because regardless of the results he will not undergo chemotherapy.  The patient wishes for his multiple illnesses to take a course and these made his peace with God.    No surgical treatment recommendations recommended from colorectal perspective.  If constipation increases he can take MiraLAX 1-2 doses a day, currently no indication for stoma diversion

## 2025-05-27 NOTE — PROGRESS NOTES
Name: Caesar Hurd      : 1955      MRN: [id number]  Encounter Provider: Chaitanya Little MD  Encounter Date: 2025   Encounter department: [facility] COLON [facility] RECTAL [facility] [place]  :  Assessment & Plan  Cancer of multiple primary sites (HCC)  The patient has pelvic tumor most probably recurrent prostate cancer extrinsically compressing the rectum and creating an outlet function compromise.  The patient has undergone chemotherapy for lymphoma, his prostate cancer previously and his renal cell cancer.  The patient send states that he does not wish to proceed with biopsy of his liver or pelvic lymph nodes because regardless of the results he will not undergo chemotherapy.  The patient wishes for his multiple illnesses to take a course and these made his peace with God.    No surgical treatment recommendations recommended from colorectal perspective.  If constipation increases he can take MiraLAX 1-2 doses a day, currently no indication for stoma diversion         Currently asymptomatic HIV infection, with history of HIV-related illness (HCC)         Malignant neoplasm of prostate (HCC)         Lesion of liver         Renal cell carcinoma of left kidney (HCC)             History of Present Illness   HPI  Caesar Hurd is a 70 y.o. male who presents for commentary concerning his rectal extrinsic mass.  Patient's CEA level is currently 27.1, and his PSA is 188.6.  The patient underwent colonoscopic evaluation 2024, by Dr. Hare, impression  FINDINGS:  One sessile polyp measuring smaller than 5 mm in the rectum; bleeding occurred after intervention; performed cold forceps biopsy with complete en bloc removal  The terminal ileum, ileocecal valve, cecum, ascending colon, hepatic flexure, transverse colon, splenic flexure, descending colon, sigmoid colon and rectosigmoid appeared normal.  A follow-up flexible sigmoidoscopy exam was completed 2025 by Dr. Hansen, to investigate for  possible rectal mass impression  Constipation likely secondary to extrinsic compression from pelvic mass. No mucosal lesion was seen on both colonoscopy or this flexible sigmoidoscopy.   Consult IR for biopsy for pelvic mass vs. Liver lesions.      At the time of this office visit patient CBC was reviewed magnesium BMP UA CMP PSA CEA  History obtained from: patient    Review of Systems   Constitutional:  Negative for chills and fever.   HENT:  Negative for ear pain and sore throat.    Eyes:  Negative for pain and visual disturbance.   Respiratory:  Negative for cough and shortness of breath.    Cardiovascular:  Negative for chest pain and palpitations.   Gastrointestinal:  Negative for abdominal pain and vomiting.   Genitourinary:  Negative for dysuria and hematuria.   Musculoskeletal:  Negative for arthralgias and back pain.   Skin:  Negative for color change and rash.   Neurological:  Negative for seizures and syncope.   All other systems reviewed and are negative.    Past Medical History   Past Medical History[1]  Past Surgical History[2]  Family History[3]   reports that he has never smoked. He has never used smokeless tobacco. He reports that he does not currently use alcohol. He reports that he does not use drugs.  Current Outpatient Medications   Medication Instructions    Abiraterone Acetate 1,000 mg, Daily    amitriptyline (ELAVIL) 50 mg    amLODIPine (NORVASC) 5 mg, Oral, Daily    aspirin (ECOTRIN LOW STRENGTH) 81 mg, Daily    atorvastatin (LIPITOR) 40 mg, Daily    bictegravir-emtricitab-tenofovir alafenamide (Biktarvy) -25 MG tablet 1 tablet, Daily    DULoxetine (CYMBALTA) 60 mg, 2 times daily    folic acid (FOLVITE) 1,000 mcg, Daily    gabapentin (NEURONTIN) 300 mg, 3 times daily    lidocaine (LIDODERM) 5 % PLACE 1 PATCH ON THE SKIN DAILY. REMOVE & DISCARD PATCH WITHIN 12 HOURS OR AS DIRECTED BY MD    metoprolol succinate (TOPROL-XL) 50 mg, Daily    oxyCODONE (ROXICODONE) 30 mg, Every 4 hours PRN  "   polyethylene glycol (MIRALAX) 17 g, Oral, Daily    rOPINIRole (REQUIP) 1 mg    senna-docusate sodium (SENOKOT S) 8.6-50 mg per tablet 1 tablet, Oral, 2 times daily    traZODone (DESYREL) 150 mg    triamcinolone (KENALOG) 0.5 % cream 3 times daily    zolpidem (AMBIEN) 10 mg, Daily PRN   Allergies[4]      Objective   /59   Pulse 70   Ht 5' 8\" (1.727 m)   Wt 73.5 kg (162 lb)   SpO2 99%   BMI 24.63 kg/m²          Administrative Statements   I have spent a total time of 45 minutes minutes in caring for this patient on the day of the visit/encounter including Diagnostic results, Patient and family education, Impressions, Counseling / Coordination of care, Documenting in the medical record, Reviewing/placing orders in the medical record (including tests, medications, and/or procedures), and Obtaining or reviewing history  .         [1]   Past Medical History:  Diagnosis Date    Prostate cancer (HCC)    [2]   Past Surgical History:  Procedure Laterality Date    KIDNEY SURGERY Left     removal   [3]   Family History  Problem Relation Name Age of Onset    No Known Problems Mother      No Known Problems Father     [4] No Known Allergies    "

## 2025-05-29 ENCOUNTER — CONSULT (OUTPATIENT)
Dept: HEMATOLOGY ONCOLOGY | Facility: CLINIC | Age: 70
End: 2025-05-29
Attending: PHYSICIAN ASSISTANT
Payer: COMMERCIAL

## 2025-05-29 VITALS
HEIGHT: 68 IN | OXYGEN SATURATION: 99 % | DIASTOLIC BLOOD PRESSURE: 60 MMHG | TEMPERATURE: 98.3 F | RESPIRATION RATE: 17 BRPM | BODY MASS INDEX: 24.32 KG/M2 | SYSTOLIC BLOOD PRESSURE: 138 MMHG | WEIGHT: 160.5 LBS | HEART RATE: 75 BPM

## 2025-05-29 DIAGNOSIS — C61 MALIGNANT NEOPLASM OF PROSTATE (HCC): Primary | Chronic | ICD-10-CM

## 2025-05-29 DIAGNOSIS — Z21 HIV INFECTION, UNSPECIFIED SYMPTOM STATUS (HCC): Chronic | ICD-10-CM

## 2025-05-29 DIAGNOSIS — C64.2 RENAL CELL CARCINOMA OF LEFT KIDNEY (HCC): ICD-10-CM

## 2025-05-29 DIAGNOSIS — Z85.72 HISTORY OF LYMPHOMA: ICD-10-CM

## 2025-05-29 PROCEDURE — 99205 OFFICE O/P NEW HI 60 MIN: CPT | Performed by: INTERNAL MEDICINE

## 2025-05-29 NOTE — PROGRESS NOTES
Name: Caesar Hurd      : 1955      MRN: [id number]  Encounter Provider: Denia Salas  Encounter Date: 2025   Encounter department: [facility] [place]  :  Assessment & Plan  Malignant neoplasm of prostate (HCC)  2019 TRUSS biopsy which identified 17 of 17 cores positive with prostate adenocarcinoma. 3 cores of [place] 10 (5+5) and 14 cores of Gena 9. The patient was referred to radiation oncology at [facility] and saw Dr. Owen on 2019. Dr. Owen agreed with the urologist that the patient she received 2 years of ADT and external beam radiotherapy, however as the patient was closer to the [facility] thought that the patient may want to seek treatment closer to home. Bone scan was negative and CT scan of a/p showed no lymphadenopathy, hydronephrosis or evidence of gross metastatic disease.   Completed definitive prostate EBRT (70 Gy/28 fractions) on 8/10/2020  Patient does report getting ADT for 2 years.   [?  4 injections]  He was primarily following with urology after that and had normal PSA tillSept2023 when it went to 9.68 from 0.06 in 2022.  It then went to 10.9 in 2024.  Patient did have a CT abdomen pelvis in 2024 which showed left lower lobe infiltrates interval increase in left perirectal soft tissue as well as soft tissue mass in inferior left prostate prostate bed progressive pelvic lymphadenopathy consistent with metastatic disease and progression of osseous metastatic disease.  CEA in 2025 was 5.9.  Patient reports could not establish care with oncology till 2024.  At that time he was given bicalutamide and then also started on Lupron which he received on 2025 along with Zytiga and prednisone.  Patient reports secondary to some insurance issues he could not start the Zytiga till about 3 weeks ago.  He is currently on 1000 mg p.o. daily.  He reports not  taking the prednisone at this time as he is concerned about the immunosuppression.  Patient in the interim also developed progressive metastatic disease in the liver.  He has been recommended IR guided liver biopsy.  Patient personal wishes are against any chemotherapy or any invasive biopsy procedures.   I discussed my recommendation is that I agree that a biopsy would help in establishing either a second primary or any transformation of the prostatic cancer.  If however he does not want any biopsy or any further chemotherapy then I would recommend continuing with his androgen deprivation therapy and the Zytiga.  If he is concerned about the use of prednisone alternatively enzalutamide can be tried.  However given that he just started the Zytiga after significant insurance issues to avoid any break in therapy I recommend continuing with the same.  He can start prednisone at a lower dose of 5 mg p.o. daily.  He reports no side effects related to fluid retention hypertension and his blood work was reviewed which did not show any hypokalemia.  Recommend monitoring his PSA and also testosterone to ensure castrate levels.  Continuing Lupron as per the schedule.      He had questions about other additional measures.  These would mainly involve consideration of additional chemotherapy with docetaxel which patient currently declined.    Other additional modalities would be radiation therapy which has been offered to the patient during his recent hospitalization which would help in control of his symptoms and would mostly be for palliative reasons.  However patient currently wants to hold off.  He refused getting an MRI lumbosacral spine which was recommended.     Renal cell carcinoma of left kidney (HCC)  History of nephrectomy       HIV infection, unspecified symptom status (HCC)  Currently it is undetectable on current treatment.  As per patient.       History of lymphoma  With history of chemotherapy for 10 cycles in  2008.         Assessment & Plan        Return in about 3 months (around 8/29/2025).  Or as needed.    History of Present Illness   Chief Complaint   Patient presents with    Consult     History of Present Illness  The patient presents for prostate cancer, HIV, lymphoma, and back pain history of left renal cancer status post nephrectomy.  He is presenting here for a second opinion.    Patient reports he was diagnosed with prostate cancer in 2019 [2 in the review of medical chart it is mention he was diagnosed in November 2017 with an elevated PSA] and subsequently underwent radiation therapy at [facility] under the care of Dr. Seo. Despite an initial remission, the cancer recurred after a year. His urologist referred him back to oncology, where he received his first Lupron injection in 02/2025. He is uncertain about the dosage of his current treatment compared to his initial diagnosis but believes it to be 30 mg. He has experienced issues with missed appointments due to lack of reminders, resulting in a 9-month period without treatment. He reports that the cancer has now spread and is difficult to control. He has been on Zytiga 500 mg twice daily for approximately 3 weeks. He has developed lymph nodes in his pelvis and 3 spots on his liver, along with a mass. He has not undergone a liver biopsy. He is considering alternative treatments such as surgery or cryotherapy, as he does not wish to undergo chemotherapy.    He received 4 injections in 2020, which led to a year-long remission. However, the cancer returned in 02/2023. He was under the care of Dr. Seo at [facility] from 11/2021 to 01/2025 but has not seen him for the past 6 to 7 months. He is currently seeing Dr. Case. His PSA levels were normal in 2022 but began to rise in 2023 and continued to increase in 2024. He underwent an MRI of the prostate in 2023 and a PSMA PET scan in 2024, which revealed a soft tissue mass  around the rectum, lesions in the bone, particularly on the left side, and disease around the prostate on the left. He was not started on any treatment at that time. He saw Dr. Case earlier this year, who recommended starting the current medication, but he did not start it until 01/2025 due to insurance approval delays. He saw Dr. Case again in 04/2025, who reiterated the recommendation to start the medication. There was a concern about potential rectal cancer, but 2 colonoscopies did not confirm this. In the meantime, the disease progressed and spread to the liver. A CT scan of the chest showed no disease in the lungs but confirmed disease in the liver, around the rectum, and in the abdominal lymph nodes. He is considering having his CD counts checked and some lab work done before deciding whether to continue with the suppression treatment. He is planning to have his PSA levels checked soon.    PAST SURGICAL HISTORY:  He has a history of kidney cancer and underwent nephrectomy.    He also has a history of lymphoma, although he does not recall the specific type. He underwent 10 cycles of chemotherapy for lymphoma in 2008.    He has a history of HIV, which is well-controlled with treatment. His last CD4 count was undetectable, and it has been undetectable for years.    He has a history of back problems and leg problems, including a herniated disc, pinched nerve, and dislocated disc. He experienced some leg weakness a while back but has been managing it with exercise and squatting.    Pertinent Medical History      05/29/25: As above     Review of Systems   Constitutional:  Negative for chills and fever.   HENT:  Negative for ear pain and sore throat.    Eyes:  Negative for pain and visual disturbance.   Respiratory:  Negative for cough and shortness of breath.    Cardiovascular:  Negative for chest pain and palpitations.   Gastrointestinal:  Negative for abdominal pain and vomiting.   Genitourinary:  Negative for  "dysuria and hematuria.   Musculoskeletal:  Positive for back pain. Negative for arthralgias.   Skin:  Negative for color change and rash.   Neurological:  Negative for seizures and syncope.   All other systems reviewed and are negative.          Objective   /60 (BP Location: Left arm, Patient Position: Sitting, Cuff Size: Adult)   Pulse 75   Temp 98.3 °F (36.8 °C) (Temporal)   Resp 17   Ht 5' 8\" (1.727 m)   Wt 72.8 kg (160 lb 8 oz)   SpO2 99%   BMI 24.40 kg/m²     Pain Screening:  Pain Score:  (constant pain, pain meds have been helping)  ECOG   0-1  Physical Exam  Constitutional:       Appearance: Normal appearance. He is normal weight.   HENT:      Head: Normocephalic and atraumatic.      Nose: Nose normal.      Mouth/Throat:      Mouth: Mucous membranes are moist.     Eyes:      Extraocular Movements: Extraocular movements intact.      Pupils: Pupils are equal, round, and reactive to light.       Cardiovascular:      Rate and Rhythm: Normal rate.      Pulses: Normal pulses.   Pulmonary:      Effort: Pulmonary effort is normal.      Breath sounds: Normal breath sounds.   Abdominal:      General: Bowel sounds are normal.      Palpations: Abdomen is soft.      Tenderness: There is no abdominal tenderness.     Musculoskeletal:         General: Tenderness present. Normal range of motion.      Cervical back: Normal range of motion.   Lymphadenopathy:      Cervical: No cervical adenopathy.     Skin:     General: Skin is warm.     Neurological:      General: No focal deficit present.      Mental Status: He is alert and oriented to person, place, and time. Mental status is at baseline.       Physical Exam  Constitutional: No acute distress noted.  Neurological: No leg weakness noted.  Hematologic/Lymphatic: Lymphadenopathy noted in pelvis.    Results  Labs   - PSA levels: 2022, Normal   - PSA levels: 2023, Started going up   - PSA levels: 2024, Further increased    Imaging   - MRI of the prostate: 2023, " Showed a soft tissue mass around the rectum, lesions in the bone especially on the left side, and disease around the prostate on the left   - PSMA PET scan: 2024, Showed disease around the rectum and abdomen lymph nodes   - CT chest: Showed no disease in the lungs, but disease in the liver and around the rectum  Labs: I have reviewed the following labs:  Lab Results   Component Value Date/Time    WBC 12.23 (H) 05/20/2025 05:22 AM    RBC 4.11 05/20/2025 05:22 AM    Hemoglobin 12.5 05/20/2025 05:22 AM    Hematocrit 37.6 05/20/2025 05:22 AM    MCV 92 05/20/2025 05:22 AM    MCH 30.4 05/20/2025 05:22 AM    RDW 13.0 05/20/2025 05:22 AM    Platelets 230 05/20/2025 05:22 AM    Segmented % 78 (H) 05/19/2025 10:12 PM    Lymphocytes % 12 (L) 05/19/2025 10:12 PM    Monocytes % 10 05/19/2025 10:12 PM    Eosinophils Relative 0 05/19/2025 10:12 PM    Basophils Relative 0 05/19/2025 10:12 PM    Immature Grans % 0 05/19/2025 10:12 PM    Absolute Neutrophils 10.33 (H) 05/19/2025 10:12 PM     Lab Results   Component Value Date/Time    Potassium 3.6 05/20/2025 05:22 AM    Chloride 98 05/20/2025 05:22 AM    CO2 29 05/20/2025 05:22 AM    BUN 29 (H) 05/20/2025 05:22 AM    Creatinine 1.21 05/20/2025 05:22 AM    Calcium 9.1 05/20/2025 05:22 AM    AST 31 05/19/2025 10:12 PM    ALT 16 05/19/2025 10:12 PM    Alkaline Phosphatase 957 (H) 05/19/2025 10:12 PM    Total Protein 8.1 05/19/2025 10:12 PM    Albumin 4.7 05/19/2025 10:12 PM    Total Bilirubin 2.29 (H) 05/19/2025 10:12 PM    eGFR 61 05/20/2025 05:22 AM     Lab Results   Component Value Date/Time    CEA 27.1 (H) 04/29/2025 10:20 AM        Radiology Results Review: I have reviewed radiology reports from relevant scans including: PSMA PET, CT chest, and CT abdomen/pelvis.    Administrative Statements   I have spent a total time of 55 minutes in caring for this patient on the day of the visit/encounter including Diagnostic results, Prognosis, Risks and benefits of tx options, Instructions  for management, Patient and family education, Importance of tx compliance, Risk factor reductions, Impressions, Counseling / Coordination of care, Documenting in the medical record, Reviewing/placing orders in the medical record (including tests, medications, and/or procedures), and Obtaining or reviewing history  .

## 2025-05-29 NOTE — ASSESSMENT & PLAN NOTE
October 2019 TRUSS biopsy which identified 17 of 17 cores positive with prostate adenocarcinoma. 3 cores of Gen[place] 10 (5+5) and 14 cores of [place] 9. The patient was referred to radiation oncology at [facility] and saw Dr. Owen on October 14, 2019. Dr. Owen agreed with the urologist that the patient she received 2 years of ADT and external beam radiotherapy, however as the patient was closer to the [facility] thought that the patient may want to seek treatment closer to home. Bone scan was negative and CT scan of a/p showed no lymphadenopathy, hydronephrosis or evidence of gross metastatic disease.   Completed definitive prostate EBRT (70 Gy/28 fractions) on 8/10/2020  Patient does report getting ADT for 2 years.   [?  4 injections]  He was primarily following with urology after that and had normal PSA tillSeptember 2023 when it went to 9.68 from 0.06 in September 2022.  It then went to 10.9 in July 2024.  Patient did have a CT abdomen pelvis in December 2024 which showed left lower lobe infiltrates interval increase in left perirectal soft tissue as well as soft tissue mass in inferior left prostate prostate bed progressive pelvic lymphadenopathy consistent with metastatic disease and progression of osseous metastatic disease.  CEA in January 2025 was 5.9.  Patient reports could not establish care with oncology till January 2024.  At that time he was given bicalutamide and then also started on Lupron which he received on 2/14/2025 along with Zytiga and prednisone.  Patient reports secondary to some insurance issues he could not start the Zytiga till about 3 weeks ago.  He is currently on 1000 mg p.o. daily.  He reports not taking the prednisone at this time as he is concerned about the immunosuppression.  Patient in the interim also developed progressive metastatic disease in the liver.  He has been recommended IR guided liver biopsy.  Patient personal wishes are against any  chemotherapy or any invasive biopsy procedures.   I discussed my recommendation is that I agree that a biopsy would help in establishing either a second primary or any transformation of the prostatic cancer.  If however he does not want any biopsy or any further chemotherapy then I would recommend continuing with his androgen deprivation therapy and the Zytiga.  If he is concerned about the use of prednisone alternatively enzalutamide can be tried.  However given that he just started the Zytiga after significant insurance issues to avoid any break in therapy I recommend continuing with the same.  He can start prednisone at a lower dose of 5 mg p.o. daily.  He reports no side effects related to fluid retention hypertension and his blood work was reviewed which did not show any hypokalemia.  Recommend monitoring his PSA and also testosterone to ensure castrate levels.  Continuing Lupron as per the schedule.      He had questions about other additional measures.  These would mainly involve consideration of additional chemotherapy with docetaxel which patient currently declined.    Other additional modalities would be radiation therapy which has been offered to the patient during his recent hospitalization which would help in control of his symptoms and would mostly be for palliative reasons.  However patient currently wants to hold off.  He refused getting an MRI lumbosacral spine which was recommended.

## 2025-06-10 ENCOUNTER — PATIENT OUTREACH (OUTPATIENT)
Dept: CASE MANAGEMENT | Facility: HOSPITAL | Age: 70
End: 2025-06-10

## 2025-06-17 ENCOUNTER — PATIENT OUTREACH (OUTPATIENT)
Dept: CASE MANAGEMENT | Facility: HOSPITAL | Age: 70
End: 2025-06-17

## 2025-06-20 ENCOUNTER — PATIENT OUTREACH (OUTPATIENT)
Dept: CASE MANAGEMENT | Facility: HOSPITAL | Age: 70
End: 2025-06-20

## 2025-06-20 NOTE — PROGRESS NOTES
Attempted to reach pt again today, pt has calls restricted and the call will not go through so I am unable to LM.  This is my third attempt to reach pt without success.  Pt has my direct number from a previous conversation and is welcome to reach out as needed moving forward.  Closing OncSW episode at this time.

## 2025-07-21 ENCOUNTER — TELEPHONE (OUTPATIENT)
Dept: FAMILY MEDICINE CLINIC | Facility: CLINIC | Age: 70
End: 2025-07-21

## 2025-07-21 NOTE — TELEPHONE ENCOUNTER
Left message for patient to call the office to schedule an appointment to establish care with a PCP per referral from [facility].